# Patient Record
Sex: FEMALE | Race: BLACK OR AFRICAN AMERICAN | NOT HISPANIC OR LATINO | Employment: FULL TIME | URBAN - METROPOLITAN AREA
[De-identification: names, ages, dates, MRNs, and addresses within clinical notes are randomized per-mention and may not be internally consistent; named-entity substitution may affect disease eponyms.]

---

## 2017-05-26 DIAGNOSIS — Z36.89 ENCOUNTER TO ESTABLISH GESTATIONAL AGE USING ULTRASOUND: Primary | ICD-10-CM

## 2017-06-08 ENCOUNTER — INITIAL PRENATAL (OUTPATIENT)
Dept: OBSTETRICS AND GYNECOLOGY | Facility: CLINIC | Age: 22
End: 2017-06-08

## 2017-06-08 VITALS
WEIGHT: 111 LBS | HEIGHT: 63 IN | SYSTOLIC BLOOD PRESSURE: 100 MMHG | BODY MASS INDEX: 19.67 KG/M2 | DIASTOLIC BLOOD PRESSURE: 60 MMHG

## 2017-06-08 DIAGNOSIS — A60.09: ICD-10-CM

## 2017-06-08 DIAGNOSIS — Z36.9 ANTENATAL SCREENING ENCOUNTER: ICD-10-CM

## 2017-06-08 DIAGNOSIS — B37.31 YEAST VAGINITIS: ICD-10-CM

## 2017-06-08 DIAGNOSIS — Z01.419 WELL FEMALE EXAM WITH ROUTINE GYNECOLOGICAL EXAM: Primary | ICD-10-CM

## 2017-06-08 DIAGNOSIS — Z34.81 PRENATAL CARE, SUBSEQUENT PREGNANCY, FIRST TRIMESTER: ICD-10-CM

## 2017-06-08 DIAGNOSIS — F41.8 DEPRESSION WITH ANXIETY: ICD-10-CM

## 2017-06-08 DIAGNOSIS — O98.311: ICD-10-CM

## 2017-06-08 LAB
BACTERIA UR QL AUTO: ABNORMAL /HPF
BILIRUB UR QL STRIP: NEGATIVE
CLARITY UR: ABNORMAL
COLOR UR: YELLOW
GLUCOSE UR STRIP-MCNC: NEGATIVE MG/DL
HGB UR QL STRIP.AUTO: NEGATIVE
HYALINE CASTS UR QL AUTO: ABNORMAL /LPF
KETONES UR QL STRIP: NEGATIVE
LEUKOCYTE ESTERASE UR QL STRIP.AUTO: ABNORMAL
NITRITE UR QL STRIP: NEGATIVE
PH UR STRIP.AUTO: 7.5 [PH] (ref 5–8)
PROT UR QL STRIP: ABNORMAL
RBC # UR: ABNORMAL /HPF
REF LAB TEST METHOD: ABNORMAL
SP GR UR STRIP: 1.02 (ref 1–1.03)
SQUAMOUS #/AREA URNS HPF: ABNORMAL /HPF
UROBILINOGEN UR QL STRIP: ABNORMAL
WBC UR QL AUTO: ABNORMAL /HPF
WET PREP GENITAL: NORMAL

## 2017-06-08 PROCEDURE — 99204 OFFICE O/P NEW MOD 45 MIN: CPT | Performed by: OBSTETRICS & GYNECOLOGY

## 2017-06-08 PROCEDURE — 81001 URINALYSIS AUTO W/SCOPE: CPT | Performed by: OBSTETRICS & GYNECOLOGY

## 2017-06-08 PROCEDURE — 87086 URINE CULTURE/COLONY COUNT: CPT | Performed by: OBSTETRICS & GYNECOLOGY

## 2017-06-08 PROCEDURE — 87210 SMEAR WET MOUNT SALINE/INK: CPT | Performed by: OBSTETRICS & GYNECOLOGY

## 2017-06-08 RX ORDER — DIPHENHYDRAMINE HYDROCHLORIDE 25 MG/1
CAPSULE ORAL
Refills: 0 | COMMUNITY
Start: 2017-05-04 | End: 2018-01-29

## 2017-06-08 RX ORDER — VALACYCLOVIR HYDROCHLORIDE 500 MG/1
TABLET, FILM COATED ORAL
COMMUNITY
Start: 2017-06-07 | End: 2017-08-24 | Stop reason: SDUPTHER

## 2017-06-08 RX ORDER — PRENATAL VIT NO.126/IRON/FOLIC 28MG-0.8MG
TABLET ORAL
Refills: 1 | COMMUNITY
Start: 2017-04-19 | End: 2017-08-24 | Stop reason: CLARIF

## 2017-06-08 RX ORDER — CALCIUM CARBONATE 750 MG/1
TABLET, CHEWABLE ORAL
Refills: 0 | COMMUNITY
Start: 2017-05-04 | End: 2017-08-24

## 2017-06-10 LAB — BACTERIA SPEC AEROBE CULT: NORMAL

## 2017-06-22 ENCOUNTER — ROUTINE PRENATAL (OUTPATIENT)
Dept: OBSTETRICS AND GYNECOLOGY | Facility: CLINIC | Age: 22
End: 2017-06-22

## 2017-06-22 ENCOUNTER — APPOINTMENT (OUTPATIENT)
Dept: LAB | Facility: HOSPITAL | Age: 22
End: 2017-06-22

## 2017-06-22 VITALS — WEIGHT: 109 LBS | BODY MASS INDEX: 19.31 KG/M2

## 2017-06-22 DIAGNOSIS — F41.8 DEPRESSION WITH ANXIETY: ICD-10-CM

## 2017-06-22 DIAGNOSIS — K59.00 CONSTIPATION, UNSPECIFIED CONSTIPATION TYPE: ICD-10-CM

## 2017-06-22 DIAGNOSIS — Z34.81 PRENATAL CARE, SUBSEQUENT PREGNANCY, FIRST TRIMESTER: Primary | ICD-10-CM

## 2017-06-22 DIAGNOSIS — O21.9 NAUSEA AND VOMITING DURING PREGNANCY PRIOR TO 22 WEEKS GESTATION: ICD-10-CM

## 2017-06-22 LAB
ABO GROUP BLD: NORMAL
BASOPHILS # BLD AUTO: 0.01 10*3/MM3 (ref 0–0.2)
BASOPHILS NFR BLD AUTO: 0.1 % (ref 0–1)
BLD GP AB SCN SERPL QL: NEGATIVE
DEPRECATED RDW RBC AUTO: 39.4 FL (ref 37–54)
EOSINOPHIL # BLD AUTO: 0.09 10*3/MM3 (ref 0.1–0.3)
EOSINOPHIL NFR BLD AUTO: 1.1 % (ref 0–3)
ERYTHROCYTE [DISTWIDTH] IN BLOOD BY AUTOMATED COUNT: 14.2 % (ref 11.3–14.5)
HBA1C MFR BLD: 5.2 % (ref 4.8–5.6)
HBV SURFACE AG SERPL QL IA: NORMAL
HCT VFR BLD AUTO: 36.7 % (ref 34.5–44)
HCV AB SER DONR QL: NORMAL
HGB BLD-MCNC: 11.7 G/DL (ref 11.5–15.5)
HIV1+2 AB SER QL: NORMAL
IMM GRANULOCYTES # BLD: 0.02 10*3/MM3 (ref 0–0.03)
IMM GRANULOCYTES NFR BLD: 0.3 % (ref 0–0.6)
LYMPHOCYTES # BLD AUTO: 1.66 10*3/MM3 (ref 0.6–4.8)
LYMPHOCYTES NFR BLD AUTO: 21 % (ref 24–44)
MCH RBC QN AUTO: 24.1 PG (ref 27–31)
MCHC RBC AUTO-ENTMCNC: 31.9 G/DL (ref 32–36)
MCV RBC AUTO: 75.5 FL (ref 80–99)
MONOCYTES # BLD AUTO: 0.4 10*3/MM3 (ref 0–1)
MONOCYTES NFR BLD AUTO: 5.1 % (ref 0–12)
NEUTROPHILS # BLD AUTO: 5.73 10*3/MM3 (ref 1.5–8.3)
NEUTROPHILS NFR BLD AUTO: 72.4 % (ref 41–71)
PLATELET # BLD AUTO: 272 10*3/MM3 (ref 150–450)
PMV BLD AUTO: 12.2 FL (ref 6–12)
RBC # BLD AUTO: 4.86 10*6/MM3 (ref 3.89–5.14)
RH BLD: POSITIVE
RUBV IGG SER QL: NORMAL
RUBV IGG SER-ACNC: 28.7 IU/ML
TSH SERPL DL<=0.05 MIU/L-ACNC: 0.09 MIU/ML (ref 0.35–5.35)
WBC NRBC COR # BLD: 7.91 10*3/MM3 (ref 3.5–10.8)

## 2017-06-22 PROCEDURE — 86803 HEPATITIS C AB TEST: CPT | Performed by: OBSTETRICS & GYNECOLOGY

## 2017-06-22 PROCEDURE — 80081 OBSTETRIC PANEL INC HIV TSTG: CPT | Performed by: OBSTETRICS & GYNECOLOGY

## 2017-06-22 PROCEDURE — 83036 HEMOGLOBIN GLYCOSYLATED A1C: CPT | Performed by: OBSTETRICS & GYNECOLOGY

## 2017-06-22 PROCEDURE — 36415 COLL VENOUS BLD VENIPUNCTURE: CPT | Performed by: OBSTETRICS & GYNECOLOGY

## 2017-06-22 PROCEDURE — 84443 ASSAY THYROID STIM HORMONE: CPT | Performed by: OBSTETRICS & GYNECOLOGY

## 2017-06-22 PROCEDURE — 99213 OFFICE O/P EST LOW 20 MIN: CPT | Performed by: OBSTETRICS & GYNECOLOGY

## 2017-06-22 RX ORDER — DOCUSATE SODIUM 100 MG/1
100 CAPSULE, LIQUID FILLED ORAL 2 TIMES DAILY
Qty: 60 CAPSULE | Refills: 12 | Status: SHIPPED | OUTPATIENT
Start: 2017-06-22 | End: 2018-01-29

## 2017-06-22 RX ORDER — ONDANSETRON 4 MG/1
4 TABLET, ORALLY DISINTEGRATING ORAL EVERY 8 HOURS PRN
Qty: 30 TABLET | Refills: 3 | Status: SHIPPED | OUTPATIENT
Start: 2017-06-22 | End: 2018-01-29

## 2017-06-22 RX ORDER — ONDANSETRON 4 MG/1
TABLET, ORALLY DISINTEGRATING ORAL
Refills: 0 | COMMUNITY
Start: 2017-06-12 | End: 2017-06-22 | Stop reason: SDUPTHER

## 2017-06-22 NOTE — PROGRESS NOTES
No results found for: ABORH, LABANTI, ABID    Chief Complaint   Patient presents with   • Routine Prenatal Visit     C/O constipation, and cramping.       Today Chasity complaints of constipation   See ob flowsheet for physical exam.    Prenatal Assessment  Fetal Heart Rate: 164  Movement: Absent  Prenatal Vitals  Weight: 109 lb (49.4 kg)  Urine Glucose/Protein  Urine Glucose: Negative  Urine Protein: Trace  Edema  LLE Edema: None  RLE Edema: None  Facial Edema: None     Tests done today: none  At the time of the next visit, she will need to have none    Impression:   Encounter Diagnoses   Name Primary?   • Prenatal care, subsequent pregnancy, first trimester Yes   • Depression with anxiety    • Constipation, unspecified constipation type    • Nausea and vomiting during pregnancy prior to 22 weeks gestation        Plan: Return 4 weeks    This note was electronically signed.    Hakeem Retana MD

## 2017-06-23 LAB — RPR SER QL: NORMAL

## 2017-06-24 LAB — VZV IGG SER IA-ACNC: >4000 INDEX

## 2017-07-20 ENCOUNTER — ROUTINE PRENATAL (OUTPATIENT)
Dept: OBSTETRICS AND GYNECOLOGY | Facility: CLINIC | Age: 22
End: 2017-07-20

## 2017-07-20 VITALS — SYSTOLIC BLOOD PRESSURE: 110 MMHG | DIASTOLIC BLOOD PRESSURE: 70 MMHG | WEIGHT: 113 LBS | BODY MASS INDEX: 20.02 KG/M2

## 2017-07-20 DIAGNOSIS — Z36.89 ENCOUNTER FOR FETAL ANATOMIC SURVEY: ICD-10-CM

## 2017-07-20 DIAGNOSIS — N89.8 VAGINAL DISCHARGE DURING PREGNANCY IN SECOND TRIMESTER: ICD-10-CM

## 2017-07-20 DIAGNOSIS — R30.0 DYSURIA: Primary | ICD-10-CM

## 2017-07-20 DIAGNOSIS — O26.892 VAGINAL DISCHARGE DURING PREGNANCY IN SECOND TRIMESTER: ICD-10-CM

## 2017-07-20 DIAGNOSIS — Z34.82 PRENATAL CARE, SUBSEQUENT PREGNANCY, SECOND TRIMESTER: ICD-10-CM

## 2017-07-20 LAB
BACTERIA UR QL AUTO: ABNORMAL /HPF
BILIRUB UR QL STRIP: NEGATIVE
CLARITY UR: ABNORMAL
COLOR UR: YELLOW
GLUCOSE UR STRIP-MCNC: NEGATIVE MG/DL
HGB UR QL STRIP.AUTO: NEGATIVE
HYALINE CASTS UR QL AUTO: ABNORMAL /LPF
KETONES UR QL STRIP: NEGATIVE
LEUKOCYTE ESTERASE UR QL STRIP.AUTO: ABNORMAL
NITRITE UR QL STRIP: NEGATIVE
PH UR STRIP.AUTO: 8 [PH] (ref 5–8)
PROT UR QL STRIP: NEGATIVE
RBC # UR: ABNORMAL /HPF
REF LAB TEST METHOD: ABNORMAL
SP GR UR STRIP: 1.02 (ref 1–1.03)
SQUAMOUS #/AREA URNS HPF: ABNORMAL /HPF
UROBILINOGEN UR QL STRIP: ABNORMAL
WBC UR QL AUTO: ABNORMAL /HPF
WET PREP GENITAL: NORMAL

## 2017-07-20 PROCEDURE — 81001 URINALYSIS AUTO W/SCOPE: CPT | Performed by: OBSTETRICS & GYNECOLOGY

## 2017-07-20 PROCEDURE — 87210 SMEAR WET MOUNT SALINE/INK: CPT | Performed by: OBSTETRICS & GYNECOLOGY

## 2017-07-20 PROCEDURE — 99213 OFFICE O/P EST LOW 20 MIN: CPT | Performed by: OBSTETRICS & GYNECOLOGY

## 2017-07-20 PROCEDURE — 87086 URINE CULTURE/COLONY COUNT: CPT | Performed by: OBSTETRICS & GYNECOLOGY

## 2017-07-20 RX ORDER — TERCONAZOLE 80 MG/1
80 SUPPOSITORY VAGINAL NIGHTLY
Qty: 3 SUPPOSITORY | Refills: 3 | Status: SHIPPED | OUTPATIENT
Start: 2017-07-20 | End: 2017-08-24

## 2017-07-20 NOTE — PROGRESS NOTES
No results found for: ABORH, LABANTI, ABID    Chief Complaint   Patient presents with   • Routine Prenatal Visit   • Difficulty Urinating   • Vaginal Discharge     c/o whitest D/C and itching,        Today Chasity complains of vaginal discharge  See ob flowsheet for physical exam.    Prenatal Assessment  Fetal Heart Rate: 152  Movement: Present  Prenatal Vitals  BP: 110/70  Weight: 113 lb (51.3 kg)  Urine Glucose/Protein  Urine Glucose: Negative  Urine Protein: Trace  Vaginal Drainage  Draining Fluid: Yes  Edema  LLE Edema: None  RLE Edema: None  Facial Edema: None     Tests done today: wet mount shows yeast  At the time of the next visit, she will need to have U/S for anatomic screening - here    Impression:   Encounter Diagnoses   Name Primary?   • Dysuria Yes   • Prenatal care, subsequent pregnancy, second trimester    • Vaginal discharge during pregnancy in second trimester    • Encounter for fetal anatomic survey        Plan: return 3 weeks,Wet mount shows yeast, will treat with Terazol sup, anatomical US on return    This note was electronically signed.    Hakeem Retana MD

## 2017-07-22 ENCOUNTER — HOSPITAL ENCOUNTER (OUTPATIENT)
Facility: HOSPITAL | Age: 22
Setting detail: OBSERVATION
Discharge: HOME | End: 2017-07-22
Attending: OBSTETRICS & GYNECOLOGY | Admitting: OBSTETRICS & GYNECOLOGY

## 2017-07-22 VITALS
HEART RATE: 87 BPM | SYSTOLIC BLOOD PRESSURE: 103 MMHG | RESPIRATION RATE: 17 BRPM | TEMPERATURE: 98.3 F | DIASTOLIC BLOOD PRESSURE: 56 MMHG

## 2017-07-22 PROBLEM — Z34.90 PREGNANCY: Status: ACTIVE | Noted: 2017-07-22

## 2017-07-22 LAB — BACTERIA SPEC AEROBE CULT: NORMAL

## 2017-07-22 PROCEDURE — 99213 OFFICE O/P EST LOW 20 MIN: CPT | Performed by: OBSTETRICS & GYNECOLOGY

## 2017-07-22 PROCEDURE — G0378 HOSPITAL OBSERVATION PER HR: HCPCS

## 2017-07-22 RX ORDER — ACETAMINOPHEN 325 MG/1
650 TABLET ORAL EVERY 6 HOURS PRN
COMMUNITY
End: 2017-12-21 | Stop reason: HOSPADM

## 2017-07-22 NOTE — NURSING NOTE
D/C instructions reviewed with patient.  V/U.  No further questions at this time.  V/S stable.  Alert, oriented, and smiling.  W/C ride requested.  D/C'd with belongings.

## 2017-07-22 NOTE — PROGRESS NOTES
"[facility]  Obstetric History and Physical    Chief Complaint   Patient presents with   • Abdominal Cramping     \"It starts down low and shoots to my back, it started early yesterday\" \"I don't know if I've been workin too much or what\"   • Headache     \"Tylenol won't get rid of it, it started the same time as my cramping\"   • Sore Throat     \"It hurts when I swallow\"   • Decreased Fetal Movement     \"Sometimes I feel flutters when I get out of the bathtub, but I haven't been feeling them\"       Subjective     Patient is a 22 y.o. female  currently at 17w2d, who presents with C/O , mild pressure and cramping for 2 days without associated leaking of fluid or vaginal bleeding.  Patient denies any other associated symptoms.  Patient missed having intermittent headaches.  Patient's prenatal course with Dr. Sharif without complications..    Her prenatal care is complicated by  nne.  Her previous obstetric/gynecological history is noted for is remarkable for .    The following portions of the patients history were reviewed and updated as appropriate: current medications, allergies, past medical history, past surgical history, past family history, past social history and problem list .       Prenatal Information:   Maternal Prenatal Labs  Blood Type No results found for: ABO   Rh Status No results found for: RH   Antibody Screen No results found for: ABSCRN   Gonnorhea No results found for: GCCX   Chlamydia No results found for: CLAMYDCU   RPR No results found for: RPR   Syphilis Antibody No results found for: SYPHILIS   Rubella No results found for: RUBELLAIGGIN   Hepatitis B Surface Antigen No results found for: HEPBSAG   HIV-1 Antibody No results found for: LABHIV1   Hepatitis C Antibody No results found for: HEPCAB   Rapid Urin Drug Screen No results found for: AMPMETHU, BARBITSCNUR, LABBENZSCN, LABMETHSCN, LABOPIASCN, THCURSCR, COCAINEUR, AMPHETSCREEN, PROPOXSCN, BUPRENORSCNU, METAMPSCNUR, OXYCODONESCN, " TRICYCLICSCN   Group B Strep Culture No results found for: GBSANTIGEN                 Past OB History:       Obstetric History       T0      TAB0   SAB2   E0   M0   L0       # Outcome Date GA Lbr Varinder/2nd Weight Sex Delivery Anes PTL Lv   3 Current            2 2017 5w0d    SAB      1 2013     SAB             Past Medical History: Past Medical History:   Diagnosis Date   • Anxiety    • Depression    • Genital herpes    • Scoliosis     Diagnosed at age 12      Past Surgical History Past Surgical History:   Procedure Laterality Date   • TONSILLECTOMY      age 12      Family History: Family History   Problem Relation Age of Onset   • Bipolar disorder Mother    • No Known Problems Father    • Diabetes Maternal Grandfather    • Diabetes Maternal Aunt    • Breast cancer Neg Hx    • Ovarian cancer Neg Hx    • Colon cancer Neg Hx       Social History:  reports that she has never smoked. She has never used smokeless tobacco.   reports that she does not drink alcohol.   reports that she does not use illicit drugs.                   General ROS Negative Findings:Visual Changes, Epigastric pain, Anorexia, Nausia/Vomiting, ROM and Vaginal Bleeding    ROS      Objective       Vital Signs Range for the last 24 hours  Temperature: Temp:  [98.3 °F (36.8 °C)] 98.3 °F (36.8 °C)   Temp Source: Temp src: Axillary   BP: BP: (103)/(56) 103/56   Pulse: Heart Rate:  [87] 87   Respirations: Resp:  [17] 17   SPO2:     O2 Amount (l/min):     O2 Devices     Weight:       Physical Examination:   General:   alert, appears stated age and cooperative   Skin:   normal   HEENT:     Lungs:   clear to auscultation bilaterally   Heart:   regular rate and rhythm, S1, S2 normal, no murmur, click, rub or gallop   Abdomen: Soft, gravid uterus-nontender, no rebound, no guarding, negative CVA tenderness.     Lower Extremeties Trace of edema, no calf tenderness, DTRs 2+ over 4 no clonus    Pelvis:  External genitalia: normal general  appearance  Vaginal: normal without tenderness, induration or masses  Uterus: enlarged         Presentation:    Cervix: Exam by: Method: sterile exam per physician   Dilation: Dilation: 0   Effacement:     Station:         Fetal Heart Rate Assessment   Method: Fetal HR Assessment Method: intermittent auscultation, using Doppler   Beats/min: Fetal HR (Beats/Min): 151   Baseline:     Varibility:     Accels:     Decels:     Tracing Category:       Uterine Assessment   Method:     Frequency (min):     Ctx Count in 10 min:     Duration:     Intensity:     Intensity by IUPC:     Resting Tone:     Resting Tone by IUPC:     Gibson Units:       Laboratory Results: @hhairgeq55@  Radiology Review:@lastrad@  Other Studies:    Assessment/Plan     Active Problems:    Pregnancy        Assessment:  1.  Intrauterine pregnancy at 17w2d weeks gestation with reassuring fetal status.    2.  Discomforts of pregnancy without sign of labor or rupture membranes  3.   4.      Plan:  1. discharge to home, note for work today and tomorrow follow-up with Dr. Sharif routine or when necessary basis.  2. Plan of care has been reviewed with patient.  3.  Risks, benefits of treatment plan have been discussed.  4.  All questions have been answered.  5      Pedro Pablo Martell DO  7/22/2017  3:50 PM

## 2017-08-04 ENCOUNTER — HOSPITAL ENCOUNTER (EMERGENCY)
Facility: HOSPITAL | Age: 22
Discharge: HOME | End: 2017-08-05
Attending: EMERGENCY MEDICINE | Admitting: EMERGENCY MEDICINE

## 2017-08-04 ENCOUNTER — HOSPITAL ENCOUNTER (OUTPATIENT)
Facility: HOSPITAL | Age: 22
Discharge: STILL ADMITTED OR ADMINISTRATIVE | End: 2017-08-04
Attending: OBSTETRICS & GYNECOLOGY | Admitting: OBSTETRICS & GYNECOLOGY

## 2017-08-04 VITALS — DIASTOLIC BLOOD PRESSURE: 54 MMHG | SYSTOLIC BLOOD PRESSURE: 116 MMHG | HEART RATE: 83 BPM | RESPIRATION RATE: 16 BRPM

## 2017-08-04 DIAGNOSIS — M54.6 ACUTE MIDLINE THORACIC BACK PAIN: Primary | ICD-10-CM

## 2017-08-04 DIAGNOSIS — Z33.1 IUP (INTRAUTERINE PREGNANCY), INCIDENTAL: ICD-10-CM

## 2017-08-04 LAB
BACTERIA UR QL AUTO: ABNORMAL /HPF
BILIRUB UR QL STRIP: NEGATIVE
CLARITY UR: ABNORMAL
COLOR UR: YELLOW
GLUCOSE UR STRIP-MCNC: NEGATIVE MG/DL
HGB UR QL STRIP.AUTO: NEGATIVE
HYALINE CASTS UR QL AUTO: ABNORMAL /LPF
KETONES UR QL STRIP: NEGATIVE
LEUKOCYTE ESTERASE UR QL STRIP.AUTO: ABNORMAL
NITRITE UR QL STRIP: NEGATIVE
PH UR STRIP.AUTO: 6 [PH] (ref 5–8)
PROT UR QL STRIP: NEGATIVE
RBC # UR: ABNORMAL /HPF
REF LAB TEST METHOD: ABNORMAL
SP GR UR STRIP: 1.02 (ref 1–1.03)
SQUAMOUS #/AREA URNS HPF: ABNORMAL /HPF
UROBILINOGEN UR QL STRIP: ABNORMAL
WBC UR QL AUTO: ABNORMAL /HPF

## 2017-08-04 PROCEDURE — 99283 EMERGENCY DEPT VISIT LOW MDM: CPT

## 2017-08-04 PROCEDURE — 81001 URINALYSIS AUTO W/SCOPE: CPT | Performed by: OBSTETRICS & GYNECOLOGY

## 2017-08-04 PROCEDURE — 87086 URINE CULTURE/COLONY COUNT: CPT | Performed by: OBSTETRICS & GYNECOLOGY

## 2017-08-04 PROCEDURE — 81003 URINALYSIS AUTO W/O SCOPE: CPT | Performed by: OBSTETRICS & GYNECOLOGY

## 2017-08-04 RX ORDER — HYDROCODONE BITARTRATE AND ACETAMINOPHEN 5; 325 MG/1; MG/1
1 TABLET ORAL 4 TIMES DAILY PRN
Qty: 12 TABLET | Refills: 0 | Status: SHIPPED | OUTPATIENT
Start: 2017-08-04 | End: 2018-01-29

## 2017-08-05 VITALS
BODY MASS INDEX: 20.04 KG/M2 | DIASTOLIC BLOOD PRESSURE: 46 MMHG | HEIGHT: 63 IN | TEMPERATURE: 98.6 F | WEIGHT: 113.1 LBS | HEART RATE: 95 BPM | OXYGEN SATURATION: 100 % | SYSTOLIC BLOOD PRESSURE: 93 MMHG | RESPIRATION RATE: 16 BRPM

## 2017-08-05 NOTE — ED PROVIDER NOTES
"Subjective   Patient is a 22 y.o. female presenting with back pain.   History provided by:  Patient  Back Pain   Location:  Thoracic spine  Quality:  Aching  Radiates to:  Does not radiate  Pain severity:  Severe  Pain is:  Worse during the day  Onset quality:  Sudden  Duration:  2 days  Timing:  Constant  Progression:  Waxing and waning (gets worse when on feet for a long time)  Chronicity:  New  Context comment:  Spontaneous onset  Relieved by:  Bed rest and being still (lying on side)  Worsened by:  Standing  Associated symptoms: no abdominal pain, no bladder incontinence, no dysuria, no fever and no numbness    Risk factors comment:  Denies IV drug abuse      Review of Systems   Constitutional: Negative.  Negative for fever.        Denies any infectious disease or fevers in the last month   HENT: Negative.    Respiratory: Negative.    Cardiovascular: Negative.    Gastrointestinal: Negative.  Negative for abdominal pain.   Genitourinary: Negative for bladder incontinence and dysuria.        19 weeks pregnant, normal prenatal course to date   Musculoskeletal: Positive for back pain.   Neurological: Negative.  Negative for numbness.   Psychiatric/Behavioral: Negative.    All other systems reviewed and are negative.      Past Medical History:   Diagnosis Date   • Anxiety    • Depression    • Genital herpes    • Scoliosis     Diagnosed at age 12       Allergies   Allergen Reactions   • Fruit & Vegetable Daily [Nutritional Supplements] Swelling   • Nuts      \"I'm allergic to all tree nuts and all fruits, my throat swells and tongue itches\"   • Penicillins      \"I don't remember what happens\"   • Latex Rash       Past Surgical History:   Procedure Laterality Date   • TONSILLECTOMY      age 12   • WISDOM TOOTH EXTRACTION         Family History   Problem Relation Age of Onset   • Bipolar disorder Mother    • No Known Problems Father    • Diabetes Maternal Grandfather    • Diabetes Maternal Aunt    • Breast cancer Neg Hx  "   • Ovarian cancer Neg Hx    • Colon cancer Neg Hx        Social History     Social History   • Marital status: Single     Spouse name: N/A   • Number of children: N/A   • Years of education: N/A     Social History Main Topics   • Smoking status: Never Smoker   • Smokeless tobacco: Never Used   • Alcohol use No   • Drug use: No   • Sexual activity: Yes     Partners: Male     Birth control/ protection: None     Other Topics Concern   • None     Social History Narrative           Objective   Physical Exam   Constitutional: She is oriented to person, place, and time. She appears well-developed and well-nourished. No distress.   Concerned, hurting female, worried that something bad must be wrong.   HENT:   Head: Normocephalic and atraumatic.   Airway patent.   Eyes: Conjunctivae are normal. No scleral icterus.   Neck: Phonation normal. Neck supple.   Cardiovascular: Normal rate and regular rhythm.    Pulmonary/Chest: Effort normal and breath sounds normal. No respiratory distress.   Abdominal: Soft. There is no tenderness.   Musculoskeletal:   She has a fairly small area of tenderness over the lower thoracic spine approximately T 10-11 with a little right paraspinous muscle tenderness, too.   Neurological: She is alert and oriented to person, place, and time.   Skin: Skin is warm and dry.   Psychiatric: She has a normal mood and affect. Her behavior is normal.   Nursing note and vitals reviewed.      Procedures         ED Course  ED Course   Comment By Time   UA not striking for infection, more likely represents minor contamination.  Her pain is over the lower thoracic spine and not the CVAs.  Will not antibiosis. Rubens Raya MD 08/04 6745   JOAN query unsuccessful secondary to prolonged retrieval time/inoperable JOAN system. Rubens Raya MD 08/04 [id number]                  [facility]    Final diagnoses:   Acute midline thoracic back pain   IUP (intrauterine pregnancy), incidental            Rubens Raya MD  08/05/17  [id number]

## 2017-08-05 NOTE — DISCHARGE INSTRUCTIONS
Activity as tolerated.  Do not use tylenol at the same time as you are using the hydrocodone/acetaminophen combination.  Return to the ER if having fever, trouble urinating, or numbness around your bottom and vagina, or difficulty using your legs.  None of these things are likely or expected.    CONTROLLED SUBSTANCE(S) EDUCATION  Controlled Substances have been prescribed by your provider to treat your medical condition and associated symptoms. Although Controlled Substances can be effective in relieving your pain or other symptoms, they may also cause serious adverse effects. It is important that you understand how to safely and appropriately take these medications.  Proper Use  1. Carefully following instructions for use, including timing of doses, whether to take the  medication with or without food, and any foods or other medications to avoid while taking the medication;  2. If you have low or impaired vision you should wear glasses when taking the medication and not take the medication in the dark;  3. You should read the prescription container label each time to confirm the dosage;  4. You should never use the medication after the expiration date;  5. You must never share the medication with others;  6. You must not take the medication with alcohol or other sedatives;  7. You should not take the medication to help you sleep;  8. You should never break, crush or chew the medication;  9. If you have been prescribed a skin patch (transdermal), external heat, fever and exertion can increase the absorption of these products, leading to potentially fatal overdose;  10. You should immediately contact the physician?s office to report any adverse reaction and,  11. It is illegal to share, sell or give away Controlled Substances.  Driving and Work Safety  1. Controlled Substances may cause sleepiness, clouded thinking, decreased concentration, slower reflexes, or incoordination, all of which may create a danger to you  and others when driving or operating certain type of machinery;  2. Avoid, if possible, driving or engaging in other potentially dangerous work or other activities, for a specific period of time until the initial effects of the Controlled Substances no longer create such dangers; and,  3. Ingesting other substances, such as alcohol, benzodiazepines or some cold remedies, at the same time you are taking the Controlled Substances prescribed or dispensed may increase cognitive and motor impairment.  Pregnancy  If you are pregnant or nursing a baby, avoid using Controlled Substances, or use them on a minimal basis in strict accordance with your provider?s instructions.  Potential for Overdose and Response  1. The use of Controlled Substances creates a risk of respiratory depression, which may result in serious harm or death. You and others should be watchful for the following warning signs of overmedication:  ? intoxicated behavior, such as confusion, slurred speech, or stumbling;  ? feeling dizzy or faint;  ? acting very drowsy or groggy;  ? unusual snoring, gasping, or snorting during sleep;  ? and/or difficulty waking up from sleep or difficulty in staying awake.  2. Immediately call ?911? or an emergency service upon you or your caregivers observing or experiencing any of the following conditions:  ? you cannot be aroused or waken, or are unable to talk after being awakened;  ? you have shortness of breath, slow or light breathing, or stopped breathing;  ? gurgling noises coming from your mouth or throat;  ? your body is limp, seems lifeless;  ? your face is pale or clammy;  ? your fingernails or lips are turning purple or blue; and/or  ? your heartbeat is slow, unusual or stopped  Safe Storage of Controlled Substances  1. If your Controlled Substances are not stored in a safe manner there is a potential that  partners, family members or others may improperly obtain your Controlled Substances;  2. Always keep the  Controlled Substances in the original container;  3. Store Controlled Substances in a locked cabinet or other secure storage unit, that is cool, dry and out of direct sunlight, such as:  ? an existing safe;  ? a cut-proof travel bag;  ? a portable lock box designed for travel; or,  ? a locking medical box.  4. Do not store Controlled Substances in:  ? an unlocked medicine cabinet;  ? in your car; or,  ? in a refrigerator or freezer unless specifically recommended by the prescriber or  pharmacist; and  5. Immediately notify your provider if any Controlled Substances prescribed or dispensed by the provider are stolen or improperly taken by another individual.  Proper Disposal  1. It is important to safely and appropriately dispose of unused Controlled Substances that had been prescribed or dispensed by your provider;  2. Promptly dispose of unused Controlled Substances after the expiration date of the  prescription or after you no longer require the Controlled Substances to treat your medical condition;  3. In order to safely dispose of Controlled Substances, you should turn in the unused Controlled Substances as part of an approved governmental drug take-back program. The [place] Office of Drug Control Policy has a listing of [place] Permanent Drug Disposal Locations at http://www.odcp.[place].gov - click on the [place] Prescriptions Drug Drop Map and Location on the left side of the page.  4. You should not flush Controlled Substances down the toilet; and,  5. You should personally remove any identifying information, including the prescription number, from an empty Controlled Substance container and then properly dispose of the empty container.  CONSENT FOR TREATMENT WITH CONTROLLED SUBSTANCE(S)  (This is for an initial prescription)  1. Controlled Substances  Controlled Substances are prescribed to treat a variety of conditions, including the relief  of chronic pain, to provide stimulation, promote weight loss, and  treat mood disorders.  Pain relief is an important medical reason to take Controlled Substances.  Controlled Substances are drugs or chemical substances whose possession and use are  regulated under the Controlled Substances Act. The law requires that patient are informed of the risks, benefits, and alternatives of taking Controlled Substances.  2. Adverse Effects  As with any medication, there are risks and adverse effects associated with the use of  Controlled Substances. Common adverse effects of pain medicines could include, but are not limited to: sedation or sleepiness, nausea, vomiting, constipation, pruritus (itching), confusion, respiratory depression, and urinary retention. Some of these effects may make it unsafe for you to drive a vehicle, operate heavy machinery, or perform other tasks that require concentration and coordination. Excessive use of these Controlled Substances can lead to profound sedation, respiratory depression, coma, and/or death. Regarding stimulants, adverse effects could include, but are not limited to: drug dependency, neuropsychiatric symptoms such as psychosis and nicole, weight loss, cardiovascular events such as heart attack and stroke, insomnia, hypertension, and agitation. Any questions you have regarding the Controlled Substance(s) should be discussed with the prescribing provider.  3. Physical Dependence, Tolerance, and Addiction  Although uncommon when used for their clinical indications, both pain relievers and  stimulants can cause physical dependence, tolerance, and/or addiction when used for a  prolonged period. Maintenance therapy with these Controlled Substances can cause  physical dependence. This means that if these medications are abruptly stopped, or  decreased significantly over a short period of time, a patient may experience withdrawal  symptoms such as: nervousness, irritability, insomnia, sweating, abdominal cramping,  nausea, vomiting, and diarrhea. Tolerance  occurs when the effects of these Controlled  Substances are decreased over a period of prolonged use making it necessary to increase the dosage. Physical dependence and tolerance are different than addiction. Addiction is a complex disease characterized by compulsive craving or seeking and use of a substance despite its extreme negatives on a person. The risk of addiction may be increased in a patient with a history of alcoholism or other addiction.  4. Alternatives  Controlled Substances are routinely prescribed to treat moderate to severe pain or other  medical conditions. Other medicines are available to treat these conditions that are not  associated with tolerance or addiction, however, are associated with a lower level of pain  relief or stimulation. It may also be an alternative to not take any medicine to treat these  conditions, or to use alternative modalities, other than medicine to treat these conditions.  I voluntarily consent to the receipt of the above-named Controlled Substance(s) as prescribed by my provider. I have been informed of the benefits, risks, and alternatives to taking these medications. I acknowledge that I have read and understood all of the information above and I have had the opportunity to ask questions and have them answered to my satisfaction.

## 2017-08-07 LAB — BACTERIA SPEC AEROBE CULT: NORMAL

## 2017-08-10 ENCOUNTER — ROUTINE PRENATAL (OUTPATIENT)
Dept: OBSTETRICS AND GYNECOLOGY | Facility: CLINIC | Age: 22
End: 2017-08-10

## 2017-08-10 VITALS — DIASTOLIC BLOOD PRESSURE: 68 MMHG | WEIGHT: 118 LBS | SYSTOLIC BLOOD PRESSURE: 110 MMHG | BODY MASS INDEX: 20.9 KG/M2

## 2017-08-10 DIAGNOSIS — Z36.89 ENCOUNTER FOR FETAL ANATOMIC SURVEY: ICD-10-CM

## 2017-08-10 DIAGNOSIS — N89.8 VAGINAL DISCHARGE DURING PREGNANCY IN SECOND TRIMESTER: ICD-10-CM

## 2017-08-10 DIAGNOSIS — F41.8 DEPRESSION WITH ANXIETY: ICD-10-CM

## 2017-08-10 DIAGNOSIS — B37.31 YEAST VAGINITIS: ICD-10-CM

## 2017-08-10 DIAGNOSIS — O26.892 VAGINAL DISCHARGE DURING PREGNANCY IN SECOND TRIMESTER: ICD-10-CM

## 2017-08-10 DIAGNOSIS — Z34.82 PRENATAL CARE, SUBSEQUENT PREGNANCY, SECOND TRIMESTER: Primary | ICD-10-CM

## 2017-08-10 LAB — WET PREP GENITAL: NORMAL

## 2017-08-10 PROCEDURE — 99213 OFFICE O/P EST LOW 20 MIN: CPT | Performed by: OBSTETRICS & GYNECOLOGY

## 2017-08-10 PROCEDURE — 87210 SMEAR WET MOUNT SALINE/INK: CPT | Performed by: OBSTETRICS & GYNECOLOGY

## 2017-08-10 RX ORDER — FLUCONAZOLE 150 MG/1
150 TABLET ORAL ONCE
Qty: 1 TABLET | Refills: 3 | Status: SHIPPED | OUTPATIENT
Start: 2017-08-10 | End: 2017-08-10

## 2017-08-10 NOTE — PROGRESS NOTES
No results found for: ABORH, LABANTI, ABID    Chief Complaint   Patient presents with   • Routine Prenatal Visit     C/O back pain   • Vaginal Discharge     Statss d/c is greenish , denies any odor       Today Chasity complains of greenish vaginal discharge with itching  See ob flowsheet for physical exam.    Prenatal Assessment  Fetal Heart Rate: 148  Fundal Height (cm): 21 cm  Movement: Present  Prenatal Vitals  BP: 110/68  Weight: 118 lb (53.5 kg)  Urine Glucose/Protein  Urine Glucose: Negative  Urine Protein: Negative  Vaginal Drainage  Draining Fluid: Yes  Edema  LLE Edema: None  RLE Edema: None  Facial Edema: None     Tests done today: Wet mount shows yeast infection, 20 week anatomical ultrasound  At the time of the next visit, she will need to have U/S for anatomic screening - Repeat anatomical ultrasound at [facility]    Impression:   Encounter Diagnoses   Name Primary?   • Prenatal care, subsequent pregnancy, second trimester Yes   • Depression with anxiety    • Encounter for fetal anatomic survey    • Vaginal discharge during pregnancy in second trimester    • Yeast vaginitis        Plan: We'll treat yeast infection with Terazol cream 7 pack and Diflucan, will repeat anatomical ultrasound at the [facility] to check the heart, will return in 2 weeks    This note was electronically signed.    Hakeem Retana MD

## 2017-08-24 ENCOUNTER — OFFICE VISIT (OUTPATIENT)
Dept: OBSTETRICS AND GYNECOLOGY | Facility: HOSPITAL | Age: 22
End: 2017-08-24

## 2017-08-24 ENCOUNTER — ROUTINE PRENATAL (OUTPATIENT)
Dept: OBSTETRICS AND GYNECOLOGY | Facility: CLINIC | Age: 22
End: 2017-08-24

## 2017-08-24 ENCOUNTER — HOSPITAL ENCOUNTER (OUTPATIENT)
Dept: WOMENS IMAGING | Facility: HOSPITAL | Age: 22
Discharge: HOME | End: 2017-08-24
Attending: OBSTETRICS & GYNECOLOGY | Admitting: OBSTETRICS & GYNECOLOGY

## 2017-08-24 VITALS — BODY MASS INDEX: 20.55 KG/M2 | WEIGHT: 116 LBS | SYSTOLIC BLOOD PRESSURE: 107 MMHG | DIASTOLIC BLOOD PRESSURE: 57 MMHG

## 2017-08-24 VITALS — SYSTOLIC BLOOD PRESSURE: 110 MMHG | DIASTOLIC BLOOD PRESSURE: 60 MMHG | WEIGHT: 116 LBS | BODY MASS INDEX: 20.55 KG/M2

## 2017-08-24 DIAGNOSIS — Z36.89 ENCOUNTER FOR FETAL ANATOMIC SURVEY: ICD-10-CM

## 2017-08-24 DIAGNOSIS — Z36.9 ANTENATAL SCREENING ENCOUNTER: ICD-10-CM

## 2017-08-24 DIAGNOSIS — O26.20 RECURRENT PREGNANCY LOSS WITH CURRENT PREGNANCY: ICD-10-CM

## 2017-08-24 DIAGNOSIS — Z3A.22 22 WEEKS GESTATION OF PREGNANCY: Primary | ICD-10-CM

## 2017-08-24 DIAGNOSIS — Z34.82 PRENATAL CARE, SUBSEQUENT PREGNANCY, SECOND TRIMESTER: Primary | ICD-10-CM

## 2017-08-24 PROCEDURE — 99212 OFFICE O/P EST SF 10 MIN: CPT | Performed by: OBSTETRICS & GYNECOLOGY

## 2017-08-24 PROCEDURE — 76811 OB US DETAILED SNGL FETUS: CPT | Performed by: OBSTETRICS & GYNECOLOGY

## 2017-08-24 PROCEDURE — 76811 OB US DETAILED SNGL FETUS: CPT

## 2017-08-24 RX ORDER — VALACYCLOVIR HYDROCHLORIDE 500 MG/1
500 TABLET, FILM COATED ORAL 2 TIMES DAILY
Qty: 60 TABLET | Refills: 12 | Status: SHIPPED | OUTPATIENT
Start: 2017-08-24 | End: 2019-01-04 | Stop reason: SDUPTHER

## 2017-08-24 NOTE — PROGRESS NOTES
No results found for: ABORH, LABANTI, ABID    Chief Complaint   Patient presents with   • Routine Prenatal Visit     Pt. has no complaints today, she just came from the [facility].       Today Chasity has no specific complaints  See ob flowsheet for physical exam.    Prenatal Assessment  Fetal Heart Rate: 142  Movement: Present  Prenatal Vitals  BP: 110/60  Weight: 116 lb (52.6 kg)     Tests done today: U/S for anatomic screening - anatomy completely seen today  At the time of the next visit, she will need to have GCT    Impression: No diagnosis found.    Plan: Return 2 weeks    This note was electronically signed.    Hakeem Retana MD

## 2017-08-24 NOTE — PROGRESS NOTES
Documentation of the ultasound findings, images, and interpretations with be available in the patient's Viewpoint report located in the Chart Review Imaging tab in [employer]

## 2017-09-01 ENCOUNTER — HOSPITAL ENCOUNTER (OUTPATIENT)
Facility: HOSPITAL | Age: 22
Setting detail: OBSERVATION
Discharge: HOME | End: 2017-09-02
Attending: OBSTETRICS & GYNECOLOGY | Admitting: OBSTETRICS & GYNECOLOGY

## 2017-09-01 VITALS
WEIGHT: 116 LBS | TEMPERATURE: 98.7 F | HEART RATE: 75 BPM | HEIGHT: 63 IN | SYSTOLIC BLOOD PRESSURE: 104 MMHG | BODY MASS INDEX: 20.55 KG/M2 | DIASTOLIC BLOOD PRESSURE: 59 MMHG

## 2017-09-01 PROCEDURE — 81001 URINALYSIS AUTO W/SCOPE: CPT | Performed by: OBSTETRICS & GYNECOLOGY

## 2017-09-01 PROCEDURE — 87086 URINE CULTURE/COLONY COUNT: CPT | Performed by: OBSTETRICS & GYNECOLOGY

## 2017-09-02 PROBLEM — O26.892 PELVIC PAIN AFFECTING PREGNANCY IN SECOND TRIMESTER, ANTEPARTUM: Status: ACTIVE | Noted: 2017-09-02

## 2017-09-02 PROBLEM — R10.2 PELVIC PAIN AFFECTING PREGNANCY IN SECOND TRIMESTER, ANTEPARTUM: Status: ACTIVE | Noted: 2017-09-02

## 2017-09-02 LAB
BACTERIA UR QL AUTO: ABNORMAL /HPF
BACTERIA UR QL AUTO: ABNORMAL /HPF
BILIRUB UR QL STRIP: NEGATIVE
BILIRUB UR QL STRIP: NEGATIVE
CLARITY UR: CLEAR
CLARITY UR: CLEAR
COLOR UR: YELLOW
COLOR UR: YELLOW
DEPRECATED RDW RBC AUTO: 44.5 FL (ref 37–54)
ERYTHROCYTE [DISTWIDTH] IN BLOOD BY AUTOMATED COUNT: 15.3 % (ref 11.3–14.5)
GLUCOSE UR STRIP-MCNC: NEGATIVE MG/DL
GLUCOSE UR STRIP-MCNC: NEGATIVE MG/DL
HCT VFR BLD AUTO: 30.9 % (ref 34.5–44)
HGB BLD-MCNC: 9.8 G/DL (ref 11.5–15.5)
HGB UR QL STRIP.AUTO: ABNORMAL
HGB UR QL STRIP.AUTO: NEGATIVE
HYALINE CASTS UR QL AUTO: ABNORMAL /LPF
HYALINE CASTS UR QL AUTO: ABNORMAL /LPF
KETONES UR QL STRIP: NEGATIVE
KETONES UR QL STRIP: NEGATIVE
LEUKOCYTE ESTERASE UR QL STRIP.AUTO: ABNORMAL
LEUKOCYTE ESTERASE UR QL STRIP.AUTO: ABNORMAL
MCH RBC QN AUTO: 25 PG (ref 27–31)
MCHC RBC AUTO-ENTMCNC: 31.7 G/DL (ref 32–36)
MCV RBC AUTO: 78.8 FL (ref 80–99)
NITRITE UR QL STRIP: NEGATIVE
NITRITE UR QL STRIP: NEGATIVE
PH UR STRIP.AUTO: 6.5 [PH] (ref 5–8)
PH UR STRIP.AUTO: 6.5 [PH] (ref 5–8)
PLATELET # BLD AUTO: 213 10*3/MM3 (ref 150–450)
PMV BLD AUTO: 11.7 FL (ref 6–12)
PROT UR QL STRIP: NEGATIVE
PROT UR QL STRIP: NEGATIVE
RBC # BLD AUTO: 3.92 10*6/MM3 (ref 3.89–5.14)
RBC # UR: ABNORMAL /HPF
RBC # UR: ABNORMAL /HPF
REF LAB TEST METHOD: ABNORMAL
REF LAB TEST METHOD: ABNORMAL
SP GR UR STRIP: 1.02 (ref 1–1.03)
SP GR UR STRIP: 1.02 (ref 1–1.03)
SQUAMOUS #/AREA URNS HPF: ABNORMAL /HPF
SQUAMOUS #/AREA URNS HPF: ABNORMAL /HPF
UROBILINOGEN UR QL STRIP: ABNORMAL
UROBILINOGEN UR QL STRIP: ABNORMAL
WBC NRBC COR # BLD: 12.32 10*3/MM3 (ref 3.5–10.8)
WBC UR QL AUTO: ABNORMAL /HPF
WBC UR QL AUTO: ABNORMAL /HPF

## 2017-09-02 PROCEDURE — G0378 HOSPITAL OBSERVATION PER HR: HCPCS

## 2017-09-02 PROCEDURE — 96372 THER/PROPH/DIAG INJ SC/IM: CPT

## 2017-09-02 PROCEDURE — 99219 PR INITIAL OBSERVATION CARE/DAY 50 MINUTES: CPT | Performed by: OBSTETRICS & GYNECOLOGY

## 2017-09-02 PROCEDURE — 25010000002 TERBUTALINE PER 1 MG: Performed by: OBSTETRICS & GYNECOLOGY

## 2017-09-02 PROCEDURE — 81001 URINALYSIS AUTO W/SCOPE: CPT | Performed by: OBSTETRICS & GYNECOLOGY

## 2017-09-02 PROCEDURE — 85027 COMPLETE CBC AUTOMATED: CPT | Performed by: OBSTETRICS & GYNECOLOGY

## 2017-09-02 PROCEDURE — 87086 URINE CULTURE/COLONY COUNT: CPT | Performed by: OBSTETRICS & GYNECOLOGY

## 2017-09-02 RX ORDER — TERBUTALINE SULFATE 1 MG/ML
0.25 INJECTION, SOLUTION SUBCUTANEOUS ONCE
Status: COMPLETED | OUTPATIENT
Start: 2017-09-02 | End: 2017-09-02

## 2017-09-02 RX ORDER — ONDANSETRON 4 MG/1
4 TABLET, FILM COATED ORAL EVERY 6 HOURS PRN
Status: DISCONTINUED | OUTPATIENT
Start: 2017-09-02 | End: 2017-09-02 | Stop reason: HOSPADM

## 2017-09-02 RX ADMIN — TERBUTALINE SULFATE 0.25 MG: 1 INJECTION SUBCUTANEOUS at 00:32

## 2017-09-02 RX ADMIN — ONDANSETRON 4 MG: 4 TABLET, FILM COATED ORAL at 01:20

## 2017-09-02 NOTE — H&P
"Chasity Pedro Pablo  1995  [id number]  [id number]    CC: lower abdominal and vaginal pain  HPI:  Patient is 22 y.o. black female   currently at 23w2d  Presents with c/o pelvic (lower abdominal) pain and vaginal pain,  Onset Wed, much worse today.  Initially intermittent, now constant, rates 7/10, radiates to vagina and rectum.  Movement worsens pain.  Denies assoc vag bleeding or SROM.  Good FM.  BPNC to date.    PMH:   Current meds PNV, valtrex, zoloft, vag cream (yeast infect)  Illnesses depression  Surgeries T and A, oral surg  Allergies PCN-hives   Latex-+rast    Past OB History:       Obstetric History       T0      TAB0   SAB2   E0   M0   L0       # Outcome Date GA Lbr Varinder/2nd Weight Sex Delivery Anes PTL Lv   3 Current            2 SAB 2017 5w0d    SAB      1 SAB 2013 5w0d    SAB                  SH: tob neg , EtOH neg, drugs neg  FH: heart dz neg , diabetes pos , cancer pos    General ROS: All systems reviewed and negative except for N      Physical Examination: General appearance - alert, well appearing, and in no distress  Vital signs - /59  Pulse 75  Temp 98.7 °F (37.1 °C) (Oral)   Ht 63\" (160 cm)  Wt 116 lb (52.6 kg)  LMP 2017  BMI 20.55 kg/m2  HEENT: normocephalic, atraumatic, pharynx clear   Neck - supple, no significant adenopathy  Lymphatics - no palpable lymphadenopathy, no hepatosplenomegaly  Chest - clear to auscultation, no wheezes, rales or rhonchi, symmetric air entry  Heart - normal rate, regular rhythm, normal S1, S2, no murmurs, rubs, clicks or gallops, no JVD  Abdomen - soft, no apprec tend, nondistended, no masses or organomegaly  no rebound tenderness noted, bowel sounds normal  Vaginal Exam: cl/thick/ballot, no blood in vault  Extremities - no pedal edema noted, no calf tend  Skin - normal coloration and turgor, no rashes, no suspicious skin lesions noted        Fetal monitoring: FHT's 150's, irreg contractions    Radiology     Assessment 1)IUP " 23 2/7 weeks     2)pelvic pain-?etiology, poss ligament spasms   3) contractions    Plan 1)observe     2)labs      3)blaire Oropeza MD  2017  12:20 AM

## 2017-09-02 NOTE — NURSING NOTE
D/C instructions reviewed with pt. Pt Rogerio and has no questions. Ambulated off unit in stable condition accompanied by mother

## 2017-09-04 LAB
BACTERIA SPEC AEROBE CULT: NORMAL
BACTERIA SPEC AEROBE CULT: NORMAL

## 2017-09-21 ENCOUNTER — APPOINTMENT (OUTPATIENT)
Dept: LAB | Facility: HOSPITAL | Age: 22
End: 2017-09-21

## 2017-09-21 ENCOUNTER — RESULTS ENCOUNTER (OUTPATIENT)
Dept: OBSTETRICS AND GYNECOLOGY | Facility: CLINIC | Age: 22
End: 2017-09-21

## 2017-09-21 ENCOUNTER — ROUTINE PRENATAL (OUTPATIENT)
Dept: OBSTETRICS AND GYNECOLOGY | Facility: CLINIC | Age: 22
End: 2017-09-21

## 2017-09-21 ENCOUNTER — LAB (OUTPATIENT)
Dept: LAB | Facility: HOSPITAL | Age: 22
End: 2017-09-21
Attending: OBSTETRICS & GYNECOLOGY

## 2017-09-21 VITALS — WEIGHT: 131 LBS | SYSTOLIC BLOOD PRESSURE: 110 MMHG | BODY MASS INDEX: 23.21 KG/M2 | DIASTOLIC BLOOD PRESSURE: 64 MMHG

## 2017-09-21 DIAGNOSIS — N89.8 VAGINAL ITCHING: ICD-10-CM

## 2017-09-21 DIAGNOSIS — Z34.82 PRENATAL CARE, SUBSEQUENT PREGNANCY, SECOND TRIMESTER: Primary | ICD-10-CM

## 2017-09-21 DIAGNOSIS — Z36.0 SCREENING FOR CHROMOSOMAL ANOMALIES BY AMNIOCENTESIS: Primary | ICD-10-CM

## 2017-09-21 DIAGNOSIS — Z86.19 HISTORY OF HERPES GENITALIS: ICD-10-CM

## 2017-09-21 DIAGNOSIS — Z36.9 ANTENATAL SCREENING ENCOUNTER: ICD-10-CM

## 2017-09-21 LAB
GLUCOSE 1H P 100 G GLC PO SERPL-MCNC: 92 MG/DL (ref 65–199)
GLUCOSE BLDC-MCNC: 100 MG/DL (ref 75–125)
WET PREP GENITAL: NEGATIVE

## 2017-09-21 PROCEDURE — 99213 OFFICE O/P EST LOW 20 MIN: CPT | Performed by: OBSTETRICS & GYNECOLOGY

## 2017-09-21 PROCEDURE — 36415 COLL VENOUS BLD VENIPUNCTURE: CPT | Performed by: OBSTETRICS & GYNECOLOGY

## 2017-09-21 PROCEDURE — 82962 GLUCOSE BLOOD TEST: CPT | Performed by: OBSTETRICS & GYNECOLOGY

## 2017-09-21 PROCEDURE — 87210 SMEAR WET MOUNT SALINE/INK: CPT | Performed by: OBSTETRICS & GYNECOLOGY

## 2017-09-21 PROCEDURE — 82950 GLUCOSE TEST: CPT | Performed by: OBSTETRICS & GYNECOLOGY

## 2017-09-21 RX ORDER — METRONIDAZOLE 7.5 MG/G
GEL VAGINAL
Refills: 0 | COMMUNITY
Start: 2017-08-26 | End: 2017-09-23 | Stop reason: HOSPADM

## 2017-09-21 RX ORDER — ERYTHROMYCIN AND BENZOYL PEROXIDE 30; 50 MG/G; MG/G
GEL TOPICAL
Qty: 46.6 G | Refills: 2 | Status: SHIPPED | OUTPATIENT
Start: 2017-09-21 | End: 2017-12-07

## 2017-09-21 NOTE — PROGRESS NOTES
No results found for: ABORH, LABANTI, ABID    Chief Complaint   Patient presents with   • Routine Prenatal Visit     C/O Vaginal yeast infection. Went to hospital 2 wks ago for contractions       Today Chasity complains of irregular contractions  and vaginal itching  See ob flowsheet for physical exam.    Prenatal Assessment  Fetal Heart Rate: 154  Movement: Present  Prenatal Vitals  BP: 110/64  Weight: 131 lb (59.4 kg)  Urine Glucose/Protein  Urine Glucose: Negative  Urine Protein: Trace  Edema  LLE Edema: None  RLE Edema: None  Facial Edema: None     Tests done today: GCT, Wet mount  At the time of the next visit, she will need to have none    Impression:   Encounter Diagnoses   Name Primary?   • Prenatal care, subsequent pregnancy, second trimester Yes   • History of herpes genitalis    • Vaginal itching        Plan: return 1 weeks, wet mount is negative, Lidex cream    This note was electronically signed.    Hakeem Retana MD

## 2017-09-23 ENCOUNTER — HOSPITAL ENCOUNTER (OUTPATIENT)
Facility: HOSPITAL | Age: 22
Discharge: HOME | End: 2017-09-23
Attending: OBSTETRICS & GYNECOLOGY | Admitting: OBSTETRICS & GYNECOLOGY

## 2017-09-23 VITALS
BODY MASS INDEX: 21.83 KG/M2 | DIASTOLIC BLOOD PRESSURE: 59 MMHG | TEMPERATURE: 97.8 F | SYSTOLIC BLOOD PRESSURE: 109 MMHG | RESPIRATION RATE: 18 BRPM | HEART RATE: 83 BPM | WEIGHT: 131 LBS | HEIGHT: 65 IN

## 2017-09-23 DIAGNOSIS — Z3A.24 24 WEEKS GESTATION OF PREGNANCY: Primary | ICD-10-CM

## 2017-09-23 PROBLEM — O26.893 VAGINAL DISCHARGE DURING PREGNANCY IN THIRD TRIMESTER: Status: ACTIVE | Noted: 2017-09-23

## 2017-09-23 PROBLEM — N89.8 VAGINAL DISCHARGE DURING PREGNANCY IN THIRD TRIMESTER: Status: ACTIVE | Noted: 2017-09-23

## 2017-09-23 LAB
BACTERIA UR QL AUTO: ABNORMAL /HPF
BILIRUB UR QL STRIP: NEGATIVE
CLARITY UR: CLEAR
COLOR UR: YELLOW
GLUCOSE UR STRIP-MCNC: NEGATIVE MG/DL
HGB UR QL STRIP.AUTO: NEGATIVE
HYALINE CASTS UR QL AUTO: ABNORMAL /LPF
KETONES UR QL STRIP: NEGATIVE
LEUKOCYTE ESTERASE UR QL STRIP.AUTO: ABNORMAL
NITRITE UR QL STRIP: NEGATIVE
PH UR STRIP.AUTO: 6.5 [PH] (ref 5–8)
PROT UR QL STRIP: NEGATIVE
RBC # UR: ABNORMAL /HPF
REF LAB TEST METHOD: ABNORMAL
SP GR UR STRIP: 1.02 (ref 1–1.03)
SQUAMOUS #/AREA URNS HPF: ABNORMAL /HPF
UROBILINOGEN UR QL STRIP: ABNORMAL
WBC UR QL AUTO: ABNORMAL /HPF

## 2017-09-23 PROCEDURE — G0463 HOSPITAL OUTPT CLINIC VISIT: HCPCS

## 2017-09-23 PROCEDURE — 81001 URINALYSIS AUTO W/SCOPE: CPT | Performed by: OBSTETRICS & GYNECOLOGY

## 2017-09-23 PROCEDURE — 99213 OFFICE O/P EST LOW 20 MIN: CPT | Performed by: OBSTETRICS & GYNECOLOGY

## 2017-09-23 RX ORDER — CLOTRIMAZOLE 1 %
1 CREAM WITH APPLICATOR VAGINAL NIGHTLY
Status: DISCONTINUED | OUTPATIENT
Start: 2017-09-23 | End: 2017-09-23 | Stop reason: HOSPADM

## 2017-09-23 NOTE — H&P
2017  3:10 PM  [place]  Laborist    22-year-old G1 at 26 weeks and 2 days reports with complaints of vaginal discharge and a little pain.  Good fetal movement.  No vaginal bleeding.    Benign prenatal course.  Previously treated for yeast infections.    Negative medical history  History tonsillectomy    Vital signs stable afebrile    Reassuring fetal heart rate tracing  No contractions on monitor  Cervix closed thick and high  Vaginal discharge consistent with yeast    Assessment/plan  26 week 2 day IUP  Reassuring fetal status  No evidence of  labor  Vaginal candidiasis  Treated with Lotrimin  Follow-up with primary OB as scheduled

## 2017-09-23 NOTE — NURSING NOTE
D/c instructions reviewed with patient. Hand written script given to pt. For lotrimin per Dr. Helm. All questions are answered. Pt. Is able to v/u. D/c'd to home in good condition with family

## 2017-09-26 ENCOUNTER — TELEPHONE (OUTPATIENT)
Dept: OBSTETRICS AND GYNECOLOGY | Facility: CLINIC | Age: 22
End: 2017-09-26

## 2017-09-26 NOTE — TELEPHONE ENCOUNTER
Pt called stating she has a bad yeast infection and would like a Rx called in because she does not have any money to buy an over the counter medication.  Will call in Terazol cream

## 2017-09-29 ENCOUNTER — ROUTINE PRENATAL (OUTPATIENT)
Dept: OBSTETRICS AND GYNECOLOGY | Facility: CLINIC | Age: 22
End: 2017-09-29

## 2017-09-29 VITALS — BODY MASS INDEX: 22.3 KG/M2 | WEIGHT: 134 LBS | DIASTOLIC BLOOD PRESSURE: 70 MMHG | SYSTOLIC BLOOD PRESSURE: 110 MMHG

## 2017-09-29 DIAGNOSIS — N89.8 VAGINAL DISCHARGE DURING PREGNANCY IN SECOND TRIMESTER: ICD-10-CM

## 2017-09-29 DIAGNOSIS — O26.22: Primary | ICD-10-CM

## 2017-09-29 DIAGNOSIS — F41.8 DEPRESSION WITH ANXIETY: ICD-10-CM

## 2017-09-29 DIAGNOSIS — O26.892 VAGINAL DISCHARGE DURING PREGNANCY IN SECOND TRIMESTER: ICD-10-CM

## 2017-09-29 PROCEDURE — 99213 OFFICE O/P EST LOW 20 MIN: CPT | Performed by: OBSTETRICS & GYNECOLOGY

## 2017-10-09 ENCOUNTER — TELEPHONE (OUTPATIENT)
Dept: OBSTETRICS AND GYNECOLOGY | Facility: CLINIC | Age: 22
End: 2017-10-09

## 2017-10-09 RX ORDER — METRONIDAZOLE 500 MG/1
500 TABLET ORAL 2 TIMES DAILY
Qty: 14 TABLET | Refills: 1 | Status: SHIPPED | OUTPATIENT
Start: 2017-10-09 | End: 2017-10-16

## 2017-10-09 NOTE — TELEPHONE ENCOUNTER
Pt. Called requesting results for her panel she had done.  Will call in Flagyl to her pharmacy on file.

## 2017-10-18 ENCOUNTER — ROUTINE PRENATAL (OUTPATIENT)
Dept: OBSTETRICS AND GYNECOLOGY | Facility: CLINIC | Age: 22
End: 2017-10-18

## 2017-10-18 VITALS — DIASTOLIC BLOOD PRESSURE: 72 MMHG | SYSTOLIC BLOOD PRESSURE: 110 MMHG | BODY MASS INDEX: 22.8 KG/M2 | WEIGHT: 137 LBS

## 2017-10-18 DIAGNOSIS — N89.8 VAGINAL DISCHARGE DURING PREGNANCY IN THIRD TRIMESTER: ICD-10-CM

## 2017-10-18 DIAGNOSIS — O36.8131 DECREASED FETAL MOVEMENTS IN THIRD TRIMESTER, FETUS 1 OF MULTIPLE GESTATION: ICD-10-CM

## 2017-10-18 DIAGNOSIS — O26.893 VAGINAL DISCHARGE DURING PREGNANCY IN THIRD TRIMESTER: ICD-10-CM

## 2017-10-18 DIAGNOSIS — Z34.83 PRENATAL CARE, SUBSEQUENT PREGNANCY, THIRD TRIMESTER: Primary | ICD-10-CM

## 2017-10-18 PROCEDURE — 99213 OFFICE O/P EST LOW 20 MIN: CPT | Performed by: OBSTETRICS & GYNECOLOGY

## 2017-10-18 RX ORDER — INFLUENZA VIRUS VACCINE 15; 15; 15; 15 UG/.5ML; UG/.5ML; UG/.5ML; UG/.5ML
SUSPENSION INTRAMUSCULAR
Refills: 0 | COMMUNITY
Start: 2017-09-22 | End: 2017-12-07

## 2017-10-18 RX ORDER — CLOTRIMAZOLE 1 %
CREAM WITH APPLICATOR VAGINAL
Refills: 0 | COMMUNITY
Start: 2017-09-27 | End: 2017-12-07

## 2017-10-18 NOTE — PROGRESS NOTES
No results found for: ABORH, LABANTI, ABID    Chief Complaint   Patient presents with   • Routine Prenatal Visit     Patient states a box fell on her at home , and since baby has not moved as much.       Today Chasity complains of A box following from a closet and hitting her in the abdomen.  The patient says the baby is moving a little more now.  The NST was suggested but the patient refused and is unable to stay for the procedure.  See ob flowsheet for physical exam.    Prenatal Assessment  Fetal Heart Rate: 144  Fundal Height (cm): 29 cm  Movement: Present  Presentation: Vertex  Prenatal Vitals  BP: 110/72  Weight: 137 lb (62.1 kg)  Urine Glucose/Protein  Urine Glucose: Negative  Urine Protein: Trace  Vaginal Drainage  Draining Fluid: Yes  Edema  LLE Edema: None  RLE Edema: None  Facial Edema: None     Tests done today: Patient refused NSTs and no test was done  At the time of the next visit, she will need to have none    Impression:   Encounter Diagnoses   Name Primary?   • Prenatal care, subsequent pregnancy, third trimester Yes   • Decreased fetal movements in third trimester, fetus 1 of multiple gestation    • Vaginal discharge during pregnancy in third trimester        Plan: return 2 weeks,He should think she has a yeast infection, her insurance does not cover Terazol, since she will treat with Monistat over-the-counter.    This note was electronically signed.    Hakeem Retana MD

## 2017-10-25 ENCOUNTER — HOSPITAL ENCOUNTER (OUTPATIENT)
Facility: HOSPITAL | Age: 22
Setting detail: OBSERVATION
Discharge: HOME | End: 2017-10-26
Attending: OBSTETRICS & GYNECOLOGY | Admitting: OBSTETRICS & GYNECOLOGY

## 2017-10-25 PROCEDURE — G0378 HOSPITAL OBSERVATION PER HR: HCPCS

## 2017-10-26 VITALS
HEART RATE: 86 BPM | BODY MASS INDEX: 24.1 KG/M2 | SYSTOLIC BLOOD PRESSURE: 115 MMHG | HEIGHT: 63 IN | TEMPERATURE: 98.3 F | RESPIRATION RATE: 18 BRPM | DIASTOLIC BLOOD PRESSURE: 59 MMHG | WEIGHT: 136 LBS

## 2017-10-26 LAB
BACTERIA UR QL AUTO: ABNORMAL /HPF
BILIRUB UR QL STRIP: NEGATIVE
CLARITY UR: ABNORMAL
COLOR UR: YELLOW
GLUCOSE UR STRIP-MCNC: NEGATIVE MG/DL
HGB UR QL STRIP.AUTO: NEGATIVE
HYALINE CASTS UR QL AUTO: ABNORMAL /LPF
KETONES UR QL STRIP: ABNORMAL
LEUKOCYTE ESTERASE UR QL STRIP.AUTO: ABNORMAL
MUCOUS THREADS URNS QL MICRO: ABNORMAL /HPF
NITRITE UR QL STRIP: NEGATIVE
PH UR STRIP.AUTO: 6.5 [PH] (ref 5–8)
PROT UR QL STRIP: ABNORMAL
RBC # UR: ABNORMAL /HPF
REF LAB TEST METHOD: ABNORMAL
SP GR UR STRIP: 1.03 (ref 1–1.03)
SQUAMOUS #/AREA URNS HPF: ABNORMAL /HPF
UROBILINOGEN UR QL STRIP: ABNORMAL
WBC UR QL AUTO: ABNORMAL /HPF

## 2017-10-26 PROCEDURE — 59025 FETAL NON-STRESS TEST: CPT

## 2017-10-26 PROCEDURE — 81001 URINALYSIS AUTO W/SCOPE: CPT | Performed by: OBSTETRICS & GYNECOLOGY

## 2017-10-26 PROCEDURE — 87086 URINE CULTURE/COLONY COUNT: CPT | Performed by: OBSTETRICS & GYNECOLOGY

## 2017-10-26 PROCEDURE — 99218 PR INITIAL OBSERVATION CARE/DAY 30 MINUTES: CPT | Performed by: OBSTETRICS & GYNECOLOGY

## 2017-10-26 PROCEDURE — 59025 FETAL NON-STRESS TEST: CPT | Performed by: OBSTETRICS & GYNECOLOGY

## 2017-10-26 PROCEDURE — G0378 HOSPITAL OBSERVATION PER HR: HCPCS

## 2017-10-26 NOTE — PROGRESS NOTES
[place]  Obstetric History and Physical    Chief Complaint   Patient presents with   • Abdominal Pain       Subjective     Patient is a 22 y.o. female  currently at 31w0d, who presents with C/O abdominal pain.  Describes constant  Mild lower abdominal pressure throughout the day without associated leaking of fluid or vaginal bleeding.  Patient reports normal fetal activity.  Patient denies any other associated symptoms.  Prenatal course uncomplicated with Dr. Sharif..    Her prenatal care is complicated by  none  Her previous obstetric/gynecological history is noted for is remarkable for .    The following portions of the patients history were reviewed and updated as appropriate: current medications, allergies, past medical history, past surgical history, past family history, past social history and problem list .       Prenatal Information:   Maternal Prenatal Labs  Blood Type No results found for: ABO   Rh Status No results found for: RH   Antibody Screen No results found for: ABSCRN   Gonnorhea No results found for: GCCX   Chlamydia No results found for: CLAMYDCU   RPR No results found for: RPR   Syphilis Antibody No results found for: SYPHILIS   Rubella No results found for: RUBELLAIGGIN   Hepatitis B Surface Antigen No results found for: HEPBSAG   HIV-1 Antibody No results found for: LABHIV1   Hepatitis C Antibody No results found for: HEPCAB   Rapid [place]rin Drug Screen No results found for: AMPMETHU, BARBITSCNUR, LABBENZSCN, LABMETHSCN, LABOPIASCN, THCURSCR, COCAINEUR, AMPHETSCREEN, PROPOXSCN, BUPRENORSCNU, METAMPSCNUR, OXYCODONESCN, TRICYCLICSCN   Group B Strep Culture No results found for: GBSANTIGEN                 Past OB History:       Obstetric History       T0      L0     SAB0   TAB0   Ectopic0   Multiple0   Live Births0       # Outcome Date GA Lbr Varinder/2nd Weight Sex Delivery Anes PTL Lv   3 Current            2 SAB 2017 5w0d    SAB      1 SAB 2013 5w0d    SAB              Past Medical History: Past Medical History:   Diagnosis Date   • Anxiety    • Depression    • Genital herpes    • Scoliosis     Diagnosed at age 12      Past Surgical History Past Surgical History:   Procedure Laterality Date   • TONSILLECTOMY     • WISDOM TOOTH EXTRACTION        Family History: Family History   Problem Relation Age of Onset   • Bipolar disorder Mother    • No Known Problems Father    • Diabetes Maternal Grandfather    • Diabetes Maternal Aunt    • Breast cancer Neg Hx    • Ovarian cancer Neg Hx    • Colon cancer Neg Hx       Social History:  reports that she quit smoking about 4 months ago. She has never used smokeless tobacco.   reports that she does not drink alcohol.   reports that she does not use illicit drugs.                   General ROS Negative Findings:Headaches, Visual Changes, Epigastric pain, Anorexia, Nausia/Vomiting, ROM and Vaginal Bleeding    ROS      Objective       Vital Signs Range for the last 24 hours  Temperature: Temp:  [98.3 °F (36.8 °C)] 98.3 °F (36.8 °C)   Temp Source: Temp src: Oral   BP: BP: (115)/(59) 115/59   Pulse: Heart Rate:  [86] 86   Respirations: Resp:  [16-18] 18   SPO2:     O2 Amount (l/min):     O2 Devices     Weight: Weight:  [61.7 kg (136 lb)] 61.7 kg (136 lb)     Physical Examination:   General:   alert, appears stated age and cooperative   Skin:   normal   HEENT:     Lungs:   clear to auscultation bilaterally   Heart:   regular rate and rhythm, S1, S2 normal, no murmur, click, rub or gallop   Abdomen:  soft, gravid uterus under, no guarding, rebound, negative CVA tenderness.     Lower Extremeties Tr edema, Calf tenderness, DTRs 2+ over 4 no clonus    Pelvis:  External genitalia: normal general appearance  Uterus: enlarged         Presentation: vtx   Cervix: Exam by:     Dilation: Dilation: 0   Effacement:  50%   Station: Station: -3       Fetal Heart Rate Assessment   Method: Fetal HR Assessment Method: external   Beats/min:     Baseline:      Varibility:     Accels:     Decels:     Tracing Category:     NST indications, abdominal pain, reactive, moderate variability, accelerations present 15 x 15, no decelerations, occasional contraction, onset 12:10 AM offset 1:30 AM  Uterine Assessment   Method: Method: palpation   Frequency (min):     Ctx Count in 10 min:     Duration:     Intensity:     Intensity by IUPC:     Resting Tone:     Resting Tone by IUPC:     [place] Units:       Laboratory Results: @[id number]@         Dehydration   1+ ju cult pending  Radiology Review:@lastrad@  Other Studies:    Assessment/Plan     Active Problems:    Pregnancy        Assessment:  1.  Intrauterine pregnancy at 31w0d weeks gestation with reactive fetal status.    2.  No evidence of labor or rupture membranes  3.   4.      Plan:  1. discharge to home,PTL precautions, F/U OB routine or PRN.  2. Plan of care has been reviewed with patient.  3.  Risks, benefits of treatment plan have been discussed.  4.  All questions have been answered.  5      Pedro Pablo Martell DO  10/26/2017  1:33 AM

## 2017-10-28 LAB
BACTERIA SPEC AEROBE CULT: NORMAL
BACTERIA SPEC AEROBE CULT: NORMAL

## 2017-11-02 ENCOUNTER — ROUTINE PRENATAL (OUTPATIENT)
Dept: OBSTETRICS AND GYNECOLOGY | Facility: CLINIC | Age: 22
End: 2017-11-02

## 2017-11-02 VITALS — WEIGHT: 142 LBS | DIASTOLIC BLOOD PRESSURE: 70 MMHG | SYSTOLIC BLOOD PRESSURE: 120 MMHG | BODY MASS INDEX: 25.15 KG/M2

## 2017-11-02 DIAGNOSIS — F32.A DEPRESSION AFFECTING PREGNANCY IN THIRD TRIMESTER, ANTEPARTUM: ICD-10-CM

## 2017-11-02 DIAGNOSIS — O99.343 DEPRESSION AFFECTING PREGNANCY IN THIRD TRIMESTER, ANTEPARTUM: ICD-10-CM

## 2017-11-02 DIAGNOSIS — Z34.83 PRENATAL CARE, SUBSEQUENT PREGNANCY, THIRD TRIMESTER: Primary | ICD-10-CM

## 2017-11-02 PROCEDURE — 99213 OFFICE O/P EST LOW 20 MIN: CPT | Performed by: OBSTETRICS & GYNECOLOGY

## 2017-11-02 NOTE — PROGRESS NOTES
No results found for: ABORH, LABANTI, ABID    Chief Complaint   Patient presents with   • Routine Prenatal Visit     c/o emotional swings       Today Chasity complains of depression  See ob flowsheet for physical exam.    Prenatal Assessment  Fetal Heart Rate: 146  Movement: Present  Prenatal Vitals  BP: 120/70  Weight: 142 lb (64.4 kg)  Urine Glucose/Protein  Urine Glucose: Negative  Urine Protein: Trace     Tests done today: none  At the time of the next visit, she will need to have none    Impression:   Encounter Diagnoses   Name Primary?   • Prenatal care, subsequent pregnancy, third trimester Yes   • Depression affecting pregnancy in third trimester, antepartum        Plan: Patient has been off Zoloft but will restart it, return 2 weeks    This note was electronically signed.    Hakeem Retana MD

## 2017-11-11 ENCOUNTER — HOSPITAL ENCOUNTER (OUTPATIENT)
Facility: HOSPITAL | Age: 22
Setting detail: OBSERVATION
Discharge: HOME | End: 2017-11-12
Attending: OBSTETRICS & GYNECOLOGY | Admitting: OBSTETRICS & GYNECOLOGY

## 2017-11-11 VITALS
TEMPERATURE: 98.2 F | RESPIRATION RATE: 18 BRPM | HEART RATE: 78 BPM | SYSTOLIC BLOOD PRESSURE: 101 MMHG | BODY MASS INDEX: 25.52 KG/M2 | HEIGHT: 63 IN | DIASTOLIC BLOOD PRESSURE: 58 MMHG | WEIGHT: 144 LBS

## 2017-11-11 LAB
ALBUMIN SERPL-MCNC: 3.6 G/DL (ref 3.2–4.8)
ALBUMIN/GLOB SERPL: 1.3 G/DL (ref 1.5–2.5)
ALP SERPL-CCNC: 119 U/L (ref 25–100)
ALT SERPL W P-5'-P-CCNC: 12 U/L (ref 7–40)
ANION GAP SERPL CALCULATED.3IONS-SCNC: 4 MMOL/L (ref 3–11)
AST SERPL-CCNC: 14 U/L (ref 0–33)
BACTERIA UR QL AUTO: ABNORMAL /HPF
BILIRUB SERPL-MCNC: 0.3 MG/DL (ref 0.3–1.2)
BILIRUB UR QL STRIP: NEGATIVE
BUN BLD-MCNC: 10 MG/DL (ref 9–23)
BUN/CREAT SERPL: 25 (ref 7–25)
CALCIUM SPEC-SCNC: 8.9 MG/DL (ref 8.7–10.4)
CHLORIDE SERPL-SCNC: 110 MMOL/L (ref 99–109)
CLARITY UR: ABNORMAL
CO2 SERPL-SCNC: 23 MMOL/L (ref 20–31)
COLOR UR: YELLOW
CREAT BLD-MCNC: 0.4 MG/DL (ref 0.6–1.3)
DEPRECATED RDW RBC AUTO: 40.2 FL (ref 37–54)
ERYTHROCYTE [DISTWIDTH] IN BLOOD BY AUTOMATED COUNT: 14.7 % (ref 11.3–14.5)
GFR SERPL CREATININE-BSD FRML MDRD: >150 ML/MIN/1.73
GLOBULIN UR ELPH-MCNC: 2.8 GM/DL
GLUCOSE BLD-MCNC: 99 MG/DL (ref 70–100)
GLUCOSE UR STRIP-MCNC: NEGATIVE MG/DL
HCT VFR BLD AUTO: 27.9 % (ref 34.5–44)
HGB BLD-MCNC: 9.2 G/DL (ref 11.5–15.5)
HGB UR QL STRIP.AUTO: NEGATIVE
HYALINE CASTS UR QL AUTO: ABNORMAL /LPF
KETONES UR QL STRIP: NEGATIVE
LEUKOCYTE ESTERASE UR QL STRIP.AUTO: ABNORMAL
MCH RBC QN AUTO: 24.8 PG (ref 27–31)
MCHC RBC AUTO-ENTMCNC: 33 G/DL (ref 32–36)
MCV RBC AUTO: 75.2 FL (ref 80–99)
NITRITE UR QL STRIP: NEGATIVE
PH UR STRIP.AUTO: 7.5 [PH] (ref 5–8)
PLATELET # BLD AUTO: 193 10*3/MM3 (ref 150–450)
PMV BLD AUTO: 11.9 FL (ref 6–12)
POTASSIUM BLD-SCNC: 3.6 MMOL/L (ref 3.5–5.5)
PROT SERPL-MCNC: 6.4 G/DL (ref 5.7–8.2)
PROT UR QL STRIP: NEGATIVE
RBC # BLD AUTO: 3.71 10*6/MM3 (ref 3.89–5.14)
RBC # UR: ABNORMAL /HPF
REF LAB TEST METHOD: ABNORMAL
SODIUM BLD-SCNC: 137 MMOL/L (ref 132–146)
SP GR UR STRIP: 1.02 (ref 1–1.03)
SQUAMOUS #/AREA URNS HPF: ABNORMAL /HPF
UROBILINOGEN UR QL STRIP: ABNORMAL
WBC NRBC COR # BLD: 10.97 10*3/MM3 (ref 3.5–10.8)
WBC UR QL AUTO: ABNORMAL /HPF

## 2017-11-11 PROCEDURE — 85027 COMPLETE CBC AUTOMATED: CPT | Performed by: OBSTETRICS & GYNECOLOGY

## 2017-11-11 PROCEDURE — G0378 HOSPITAL OBSERVATION PER HR: HCPCS

## 2017-11-11 PROCEDURE — 81001 URINALYSIS AUTO W/SCOPE: CPT | Performed by: OBSTETRICS & GYNECOLOGY

## 2017-11-11 PROCEDURE — 80053 COMPREHEN METABOLIC PANEL: CPT | Performed by: OBSTETRICS & GYNECOLOGY

## 2017-11-11 PROCEDURE — 59025 FETAL NON-STRESS TEST: CPT

## 2017-11-12 PROBLEM — O26.813 FATIGUE DURING PREGNANCY IN THIRD TRIMESTER: Status: ACTIVE | Noted: 2017-11-12

## 2017-11-12 PROCEDURE — 59025 FETAL NON-STRESS TEST: CPT | Performed by: OBSTETRICS & GYNECOLOGY

## 2017-11-12 PROCEDURE — 99218 PR INITIAL OBSERVATION CARE/DAY 30 MINUTES: CPT | Performed by: OBSTETRICS & GYNECOLOGY

## 2017-11-12 PROCEDURE — G0378 HOSPITAL OBSERVATION PER HR: HCPCS

## 2017-11-12 NOTE — NURSING NOTE
D/c instructions reviewed with patient. Pt states that she understands instructions and denies any questions at this time. Pt d/c home with her cousin. Pt's cousin escorted pt off unit in wheelchair.

## 2017-11-12 NOTE — H&P
"Chasity Pedro Pablo  1995  [id number]  [id number]    CC: ache all over  HPI:  Patient is 22 y.o. black female   currently at 33w3d  Presents with c/o feeling tired, achy all over, pain in ribs where baby is.  Pt denies fever, has occas N, rarely V.  Appetite and food intake WNL.  Good FM.  BPNC to date.  No ill family members, one employee with recent \"flu\".  Pt has had flu vaccine.    PMH:   Current meds PNV, valtrex bid, zoloft  Illnesses depression  Surgeries T and A, oral surg  Allergies PCN-hives       Latex-+rast    Past OB History:       Obstetric History       T0      L0     SAB0   TAB0   Ectopic0   Multiple0   Live Births0       # Outcome Date GA Lbr Varinder/2nd Weight Sex Delivery Anes PTL Lv   3 Current            2 2017 5w0d    SAB      1 SAB 2013 5w0d    SAB                  SH: tob neg , EtOH neg, drugs neg  FH: heart dz neg , diabetes pos , cancer pos    General ROS: All systems reviewed and negative except for N,edema      Physical Examination: General appearance - alert, well appearing, and in no distress  Vital signs - /58 (BP Location: Right arm, Patient Position: Lying)  Pulse 78  Temp 98.2 °F (36.8 °C) (Oral)   Resp 18  Ht 63\" (160 cm)  Wt 144 lb (65.3 kg)  LMP 2017  BMI 25.51 kg/m2  HEENT: normocephalic, atraumatic, pharynx clear   Neck - supple, no significant adenopathy  Lymphatics - no palpable lymphadenopathy, no hepatosplenomegaly  Chest - clear to auscultation, no wheezes, rales or rhonchi, symmetric air entry  Heart - normal rate, regular rhythm, normal S1, S2, no murmurs, rubs, clicks or gallops, no JVD  Abdomen - soft, nontender, nondistended, no masses or organomegaly  no rebound tenderness noted, bowel sounds normal  Vaginal Exam: deferred  Extremities - no pedal edema noted, no calf tend  Skin - normal coloration and turgor, no rashes, no suspicious skin lesions noted      Labs:    Results from last 7 days  Lab Units 17  2301   WBC " 10*3/mm3 10.97*   HEMOGLOBIN g/dL 9.2*   HEMATOCRIT % 27.9*   PLATELETS 10*3/mm3 193       Results from last 7 days  Lab Units 11/11/17  2301   SODIUM mmol/L 137   POTASSIUM mmol/L 3.6   CHLORIDE mmol/L 110*   CO2 mmol/L 23.0   BUN mg/dL 10   CREATININE mg/dL 0.40*   CALCIUM mg/dL 8.9   BILIRUBIN mg/dL 0.3   ALK PHOS U/L 119*   ALT (SGPT) U/L 12   AST (SGOT) U/L 14   GLUCOSE mg/dL 99       Fetal monitoring: indication feeling poorly , onset 2240 , offset 0008 , baseline 130 , mod BTB variability , multiple accels (15 X 15), no decels, rare contractions, interpretation reactive NST    Radiology     Assessment 1)IUP 33 3/7 weeks    2)fatigue     3)poss early viral syndrome (all labs nl)   4)anemia    Plan 1)observe     2)home     3)keep next sched appt    Bry Oropeza MD  11/12/2017  12:21 AM

## 2017-11-12 NOTE — DISCHARGE INSTRUCTIONS
Follow-up with Dr. Retana like you are scheduled this week.  Call his office with any questions or concerns.  Please return to the hospital if you think your water has broken, if you have vaginal bleeding, or you are having regular contractions.

## 2017-11-16 ENCOUNTER — ROUTINE PRENATAL (OUTPATIENT)
Dept: OBSTETRICS AND GYNECOLOGY | Facility: CLINIC | Age: 22
End: 2017-11-16

## 2017-11-16 VITALS — DIASTOLIC BLOOD PRESSURE: 70 MMHG | SYSTOLIC BLOOD PRESSURE: 112 MMHG | BODY MASS INDEX: 24.98 KG/M2 | WEIGHT: 141 LBS

## 2017-11-16 DIAGNOSIS — Z34.83 PRENATAL CARE, SUBSEQUENT PREGNANCY, THIRD TRIMESTER: Primary | ICD-10-CM

## 2017-11-16 DIAGNOSIS — N89.8 VAGINAL DISCHARGE DURING PREGNANCY IN THIRD TRIMESTER: ICD-10-CM

## 2017-11-16 DIAGNOSIS — O26.893 VAGINAL DISCHARGE DURING PREGNANCY IN THIRD TRIMESTER: ICD-10-CM

## 2017-11-16 PROCEDURE — 99213 OFFICE O/P EST LOW 20 MIN: CPT | Performed by: OBSTETRICS & GYNECOLOGY

## 2017-11-16 NOTE — PROGRESS NOTES
No results found for: ABORH, LABANTI, ABID    Chief Complaint   Patient presents with   • Routine Prenatal Visit     Pt. complains of having a vaginal odor again.  She took flagyl previously and it did help but it's back again.        Today Chasity complains of vaginal discharge  See ob flowsheet for physical exam.    Prenatal Assessment  Fetal Heart Rate: 152  Movement: Present  Prenatal Vitals  BP: 112/70  Weight: 141 lb (64 kg)  Urine Glucose/Protein  Urine Glucose: Negative  Urine Protein: Negative     Tests done today: none  At the time of the next visit, she will need to have none    Impression:   Encounter Diagnoses   Name Primary?   • Prenatal care, subsequent pregnancy, third trimester Yes       Plan: Terazol cream, return 1 week    This note was electronically signed.    Hakeem Retana MD

## 2017-11-21 ENCOUNTER — ROUTINE PRENATAL (OUTPATIENT)
Dept: OBSTETRICS AND GYNECOLOGY | Facility: CLINIC | Age: 22
End: 2017-11-21

## 2017-11-21 VITALS — BODY MASS INDEX: 25.33 KG/M2 | DIASTOLIC BLOOD PRESSURE: 60 MMHG | SYSTOLIC BLOOD PRESSURE: 100 MMHG | WEIGHT: 143 LBS

## 2017-11-21 DIAGNOSIS — O26.893 PELVIC PAIN AFFECTING PREGNANCY IN THIRD TRIMESTER, ANTEPARTUM: ICD-10-CM

## 2017-11-21 DIAGNOSIS — Z34.83 PRENATAL CARE, SUBSEQUENT PREGNANCY, THIRD TRIMESTER: ICD-10-CM

## 2017-11-21 DIAGNOSIS — R10.2 PELVIC PAIN AFFECTING PREGNANCY IN THIRD TRIMESTER, ANTEPARTUM: ICD-10-CM

## 2017-11-21 DIAGNOSIS — O36.8130 DECREASED FETAL MOVEMENTS IN THIRD TRIMESTER, SINGLE OR UNSPECIFIED FETUS: Primary | ICD-10-CM

## 2017-11-21 PROCEDURE — 99213 OFFICE O/P EST LOW 20 MIN: CPT | Performed by: OBSTETRICS & GYNECOLOGY

## 2017-11-21 PROCEDURE — 59025 FETAL NON-STRESS TEST: CPT | Performed by: OBSTETRICS & GYNECOLOGY

## 2017-11-21 NOTE — PROGRESS NOTES
No results found for: ABORH, LABANTI, ABID    Chief Complaint   Patient presents with   • Routine Prenatal Visit     c/o cold symptoms, Temp 103 last night , c/o hands and feet swelling.   • Contractions     c/o contractions , more last night than today   • Decreased Fetal Movement     moving but not as much       Today Chasity complains of fever, contractions with pelvic pain, and decreased fetal movement  See ob flowsheet for physical exam.    Prenatal Assessment  Fetal Heart Rate: 136  Movement: Present  Prenatal Vitals  BP: 100/60  Weight: 143 lb (64.9 kg)  Urine Glucose/Protein  Urine Glucose: Negative  Urine Protein: Negative     Tests done today: NST - reactive  At the time of the next visit, she will need to have none    Impression:   Encounter Diagnoses   Name Primary?   • Decreased fetal movements in third trimester, single or unspecified fetus Yes   • Prenatal care, subsequent pregnancy, third trimester    • Pelvic pain affecting pregnancy in third trimester, antepartum        Plan: Return 1 week, Tylenol for increased temperature, no cervical change, continued to weekly visits, will do an excuse for work today because of temperature last PM and contractions on the fetal monitor during the NST.  The NST is reactive.    This note was electronically signed.    Hakeem Retana MD

## 2017-11-30 ENCOUNTER — ROUTINE PRENATAL (OUTPATIENT)
Dept: OBSTETRICS AND GYNECOLOGY | Facility: CLINIC | Age: 22
End: 2017-11-30

## 2017-11-30 VITALS — DIASTOLIC BLOOD PRESSURE: 60 MMHG | BODY MASS INDEX: 25.86 KG/M2 | WEIGHT: 146 LBS | SYSTOLIC BLOOD PRESSURE: 100 MMHG

## 2017-11-30 DIAGNOSIS — Z34.83 PRENATAL CARE, SUBSEQUENT PREGNANCY, THIRD TRIMESTER: Primary | ICD-10-CM

## 2017-11-30 DIAGNOSIS — O47.03 PRETERM UTERINE CONTRACTIONS, ANTEPARTUM, THIRD TRIMESTER: ICD-10-CM

## 2017-11-30 DIAGNOSIS — O26.893 PELVIC PAIN AFFECTING PREGNANCY IN THIRD TRIMESTER, ANTEPARTUM: ICD-10-CM

## 2017-11-30 DIAGNOSIS — R10.2 PELVIC PAIN AFFECTING PREGNANCY IN THIRD TRIMESTER, ANTEPARTUM: ICD-10-CM

## 2017-11-30 PROCEDURE — 99213 OFFICE O/P EST LOW 20 MIN: CPT | Performed by: OBSTETRICS & GYNECOLOGY

## 2017-11-30 NOTE — PROGRESS NOTES
No results found for: ABORH, LABANTI, ABID    Chief Complaint   Patient presents with   • Routine Prenatal Visit     Pt. complains of having contractions every 6 to 8 hours since last night, lower back pain as well.       Today Chasity complains of irregular contractions   See ob flowsheet for physical exam.    Prenatal Assessment  Fetal Heart Rate: 144  Movement: Present  Prenatal Vitals  BP: 100/60  Weight: 146 lb (66.2 kg)  Urine Glucose/Protein  Urine Glucose: Negative  Urine Protein: Negative     Tests done today: none  At the time of the next visit, she will need to have none    Impression:   Encounter Diagnoses   Name Primary?   • Prenatal care, subsequent pregnancy, third trimester Yes   • Pelvic pain affecting pregnancy in third trimester, antepartum    •  uterine contractions, antepartum, third trimester        Plan: Return 1 week    This note was electronically signed.    Hakeem Retana MD

## 2017-12-07 ENCOUNTER — ROUTINE PRENATAL (OUTPATIENT)
Dept: OBSTETRICS AND GYNECOLOGY | Facility: CLINIC | Age: 22
End: 2017-12-07

## 2017-12-07 VITALS — WEIGHT: 150 LBS | SYSTOLIC BLOOD PRESSURE: 98 MMHG | DIASTOLIC BLOOD PRESSURE: 68 MMHG | BODY MASS INDEX: 26.57 KG/M2

## 2017-12-07 DIAGNOSIS — Z34.83 PRENATAL CARE, SUBSEQUENT PREGNANCY, THIRD TRIMESTER: Primary | ICD-10-CM

## 2017-12-07 DIAGNOSIS — O36.8390 VARIABLE FETAL HEART RATE DECELERATIONS, ANTEPARTUM: ICD-10-CM

## 2017-12-07 PROCEDURE — 99213 OFFICE O/P EST LOW 20 MIN: CPT | Performed by: OBSTETRICS & GYNECOLOGY

## 2017-12-07 PROCEDURE — 59025 FETAL NON-STRESS TEST: CPT | Performed by: OBSTETRICS & GYNECOLOGY

## 2017-12-07 NOTE — PROGRESS NOTES
No results found for: ABORH, LABANTI, ABID    Chief Complaint   Patient presents with   • Routine Prenatal Visit     reports contractions and cramping x 2 days       Today Chasity complains of irregular contractions   See ob flowsheet for physical exam.    Prenatal Assessment  Fetal Heart Rate: 156  Movement: Present  Prenatal Vitals  BP: 98/68  Weight: 68 kg (150 lb)  Urine Glucose/Protein  Urine Glucose: Negative  Urine Protein: Negative     Tests done today: NST - reactive,  Fetal heart tone deceleration was noted with a contraction with external Doppler.  NST was scheduled to monitor the baby's heart rate and no other decelerations occurred  At the time of the next visit, she will need to have none    Impression:   Encounter Diagnoses   Name Primary?   • Prenatal care, subsequent pregnancy, third trimester Yes       Plan: Return 1 week    This note was electronically signed.    Hakeem Retana MD

## 2017-12-12 RX ORDER — METRONIDAZOLE 500 MG/1
500 TABLET ORAL 2 TIMES DAILY
Qty: 14 TABLET | Refills: 0 | Status: SHIPPED | OUTPATIENT
Start: 2017-12-12 | End: 2017-12-21 | Stop reason: HOSPADM

## 2017-12-14 ENCOUNTER — ROUTINE PRENATAL (OUTPATIENT)
Dept: OBSTETRICS AND GYNECOLOGY | Facility: CLINIC | Age: 22
End: 2017-12-14

## 2017-12-14 VITALS — BODY MASS INDEX: 26.39 KG/M2 | DIASTOLIC BLOOD PRESSURE: 65 MMHG | SYSTOLIC BLOOD PRESSURE: 98 MMHG | WEIGHT: 149 LBS

## 2017-12-14 DIAGNOSIS — Z34.83 PRENATAL CARE, SUBSEQUENT PREGNANCY, THIRD TRIMESTER: Primary | ICD-10-CM

## 2017-12-14 PROCEDURE — 99212 OFFICE O/P EST SF 10 MIN: CPT | Performed by: OBSTETRICS & GYNECOLOGY

## 2017-12-14 NOTE — PROGRESS NOTES
No results found for: ABORH, LABANTI, ABID    Chief Complaint   Patient presents with   • Routine Prenatal Visit       Today Chasity complains of abdominal pain in the lower midline  See ob flowsheet for physical exam.    Prenatal Assessment  Fetal Heart Rate: 166  Movement: Present  Prenatal Vitals  BP: 98/65  Weight: 67.6 kg (149 lb)     Tests done today: none  At the time of the next visit, she will need to have none    Impression:   Encounter Diagnoses   Name Primary?   • Prenatal care, subsequent pregnancy, third trimester Yes       Plan: Return 1 week    This note was electronically signed.    Hakeem Retana MD

## 2017-12-17 ENCOUNTER — HOSPITAL ENCOUNTER (INPATIENT)
Facility: HOSPITAL | Age: 22
LOS: 3 days | Discharge: HOME | End: 2017-12-21
Attending: OBSTETRICS & GYNECOLOGY | Admitting: OBSTETRICS & GYNECOLOGY

## 2017-12-17 DIAGNOSIS — Z98.891 STATUS POST EMERGENCY CESAREAN SECTION: ICD-10-CM

## 2017-12-17 DIAGNOSIS — Z3A.38 38 WEEKS GESTATION OF PREGNANCY: Primary | ICD-10-CM

## 2017-12-18 ENCOUNTER — ANESTHESIA EVENT (OUTPATIENT)
Dept: LABOR AND DELIVERY | Facility: HOSPITAL | Age: 22
End: 2017-12-18

## 2017-12-18 ENCOUNTER — ANESTHESIA (OUTPATIENT)
Dept: LABOR AND DELIVERY | Facility: HOSPITAL | Age: 22
End: 2017-12-18

## 2017-12-18 VITALS — HEART RATE: 106 BPM | SYSTOLIC BLOOD PRESSURE: 109 MMHG | OXYGEN SATURATION: 100 % | DIASTOLIC BLOOD PRESSURE: 57 MMHG

## 2017-12-18 PROBLEM — O26.892 PELVIC PAIN AFFECTING PREGNANCY IN SECOND TRIMESTER, ANTEPARTUM: Status: RESOLVED | Noted: 2017-09-02 | Resolved: 2017-12-18

## 2017-12-18 PROBLEM — O26.813 FATIGUE DURING PREGNANCY IN THIRD TRIMESTER: Status: RESOLVED | Noted: 2017-11-12 | Resolved: 2017-12-18

## 2017-12-18 PROBLEM — N89.8 VAGINAL DISCHARGE DURING PREGNANCY IN THIRD TRIMESTER: Status: RESOLVED | Noted: 2017-09-23 | Resolved: 2017-12-18

## 2017-12-18 PROBLEM — R10.2 PELVIC PAIN AFFECTING PREGNANCY IN SECOND TRIMESTER, ANTEPARTUM: Status: RESOLVED | Noted: 2017-09-02 | Resolved: 2017-12-18

## 2017-12-18 PROBLEM — O26.20 RECURRENT PREGNANCY LOSS WITH CURRENT PREGNANCY: Status: RESOLVED | Noted: 2017-08-24 | Resolved: 2017-12-18

## 2017-12-18 PROBLEM — Z98.891 STATUS POST EMERGENCY CESAREAN SECTION: Status: ACTIVE | Noted: 2017-12-18

## 2017-12-18 PROBLEM — O26.893 VAGINAL DISCHARGE DURING PREGNANCY IN THIRD TRIMESTER: Status: RESOLVED | Noted: 2017-09-23 | Resolved: 2017-12-18

## 2017-12-18 LAB
ABO GROUP BLD: NORMAL
AMPHET+METHAMPHET UR QL: NEGATIVE
AMPHETAMINES UR QL: NEGATIVE
BARBITURATES UR QL SCN: NEGATIVE
BENZODIAZ UR QL SCN: NEGATIVE
BLD GP AB SCN SERPL QL: NEGATIVE
BUPRENORPHINE SERPL-MCNC: NEGATIVE NG/ML
CANNABINOIDS SERPL QL: NEGATIVE
COCAINE UR QL: NEGATIVE
DEPRECATED RDW RBC AUTO: 43 FL (ref 37–54)
DEPRECATED RDW RBC AUTO: 43.3 FL (ref 37–54)
ERYTHROCYTE [DISTWIDTH] IN BLOOD BY AUTOMATED COUNT: 15.7 % (ref 11.3–14.5)
ERYTHROCYTE [DISTWIDTH] IN BLOOD BY AUTOMATED COUNT: 15.8 % (ref 11.3–14.5)
HCT VFR BLD AUTO: 25.9 % (ref 34.5–44)
HCT VFR BLD AUTO: 31.8 % (ref 34.5–44)
HGB BLD-MCNC: 10.1 G/DL (ref 11.5–15.5)
HGB BLD-MCNC: 8 G/DL (ref 11.5–15.5)
MCH RBC QN AUTO: 23.5 PG (ref 27–31)
MCH RBC QN AUTO: 23.8 PG (ref 27–31)
MCHC RBC AUTO-ENTMCNC: 30.9 G/DL (ref 32–36)
MCHC RBC AUTO-ENTMCNC: 31.8 G/DL (ref 32–36)
MCV RBC AUTO: 75 FL (ref 80–99)
MCV RBC AUTO: 76 FL (ref 80–99)
METHADONE UR QL SCN: NEGATIVE
OPIATES UR QL: NEGATIVE
OXYCODONE UR QL SCN: NEGATIVE
PCP UR QL SCN: NEGATIVE
PLATELET # BLD AUTO: 176 10*3/MM3 (ref 150–450)
PLATELET # BLD AUTO: 235 10*3/MM3 (ref 150–450)
PMV BLD AUTO: 12.4 FL (ref 6–12)
PMV BLD AUTO: 12.8 FL (ref 6–12)
PROPOXYPH UR QL: NEGATIVE
RBC # BLD AUTO: 3.41 10*6/MM3 (ref 3.89–5.14)
RBC # BLD AUTO: 4.24 10*6/MM3 (ref 3.89–5.14)
RH BLD: POSITIVE
TRICYCLICS UR QL SCN: NEGATIVE
WBC NRBC COR # BLD: 11.99 10*3/MM3 (ref 3.5–10.8)
WBC NRBC COR # BLD: 14.98 10*3/MM3 (ref 3.5–10.8)

## 2017-12-18 PROCEDURE — 59514 CESAREAN DELIVERY ONLY: CPT | Performed by: OBSTETRICS & GYNECOLOGY

## 2017-12-18 PROCEDURE — 86901 BLOOD TYPING SEROLOGIC RH(D): CPT | Performed by: OBSTETRICS & GYNECOLOGY

## 2017-12-18 PROCEDURE — 25010000002 FENTANYL CITRATE (PF) 100 MCG/2ML SOLUTION: Performed by: ANESTHESIOLOGY

## 2017-12-18 PROCEDURE — 85027 COMPLETE CBC AUTOMATED: CPT | Performed by: OBSTETRICS & GYNECOLOGY

## 2017-12-18 PROCEDURE — 25010000002 BUTORPHANOL PER 1 MG: Performed by: OBSTETRICS & GYNECOLOGY

## 2017-12-18 PROCEDURE — 86850 RBC ANTIBODY SCREEN: CPT | Performed by: OBSTETRICS & GYNECOLOGY

## 2017-12-18 PROCEDURE — 25010000002 ROPIVACAINE PER 1 MG: Performed by: ANESTHESIOLOGY

## 2017-12-18 PROCEDURE — 25010000002 FENTANYL CITRATE (PF) 100 MCG/2ML SOLUTION: Performed by: NURSE ANESTHETIST, CERTIFIED REGISTERED

## 2017-12-18 PROCEDURE — C1755 CATHETER, INTRASPINAL: HCPCS

## 2017-12-18 PROCEDURE — 86900 BLOOD TYPING SEROLOGIC ABO: CPT | Performed by: OBSTETRICS & GYNECOLOGY

## 2017-12-18 PROCEDURE — C1755 CATHETER, INTRASPINAL: HCPCS | Performed by: ANESTHESIOLOGY

## 2017-12-18 PROCEDURE — 59025 FETAL NON-STRESS TEST: CPT

## 2017-12-18 PROCEDURE — 80306 DRUG TEST PRSMV INSTRMNT: CPT | Performed by: OBSTETRICS & GYNECOLOGY

## 2017-12-18 PROCEDURE — 25010000002 PROMETHAZINE PER 50 MG: Performed by: OBSTETRICS & GYNECOLOGY

## 2017-12-18 PROCEDURE — 25010000003 CEFAZOLIN IN DEXTROSE 2-4 GM/100ML-% SOLUTION: Performed by: OBSTETRICS & GYNECOLOGY

## 2017-12-18 PROCEDURE — 51702 INSERT TEMP BLADDER CATH: CPT

## 2017-12-18 PROCEDURE — 25010000002 ONDANSETRON PER 1 MG: Performed by: ANESTHESIOLOGY

## 2017-12-18 PROCEDURE — 25010000002 METHYLERGONOVINE MALEATE PER 0.2 MG: Performed by: OBSTETRICS & GYNECOLOGY

## 2017-12-18 PROCEDURE — 25010000003 MORPHINE PER 10 MG: Performed by: NURSE ANESTHETIST, CERTIFIED REGISTERED

## 2017-12-18 RX ORDER — LIDOCAINE HYDROCHLORIDE 10 MG/ML
5 INJECTION, SOLUTION INFILTRATION; PERINEURAL AS NEEDED
Status: DISCONTINUED | OUTPATIENT
Start: 2017-12-18 | End: 2017-12-18 | Stop reason: HOSPADM

## 2017-12-18 RX ORDER — PROMETHAZINE HYDROCHLORIDE 12.5 MG/1
12.5 TABLET ORAL EVERY 6 HOURS PRN
Status: DISCONTINUED | OUTPATIENT
Start: 2017-12-18 | End: 2017-12-18 | Stop reason: SDUPTHER

## 2017-12-18 RX ORDER — METHYLERGONOVINE MALEATE 0.2 MG/ML
200 INJECTION INTRAVENOUS AS NEEDED
Status: DISCONTINUED | OUTPATIENT
Start: 2017-12-18 | End: 2017-12-18 | Stop reason: HOSPADM

## 2017-12-18 RX ORDER — ONDANSETRON 2 MG/ML
4 INJECTION INTRAMUSCULAR; INTRAVENOUS ONCE AS NEEDED
Status: COMPLETED | OUTPATIENT
Start: 2017-12-18 | End: 2017-12-18

## 2017-12-18 RX ORDER — SODIUM CHLORIDE 0.9 % (FLUSH) 0.9 %
1-10 SYRINGE (ML) INJECTION AS NEEDED
Status: DISCONTINUED | OUTPATIENT
Start: 2017-12-18 | End: 2017-12-18 | Stop reason: HOSPADM

## 2017-12-18 RX ORDER — FAMOTIDINE 10 MG/ML
20 INJECTION, SOLUTION INTRAVENOUS ONCE AS NEEDED
Status: DISCONTINUED | OUTPATIENT
Start: 2017-12-18 | End: 2017-12-18 | Stop reason: HOSPADM

## 2017-12-18 RX ORDER — SIMETHICONE 80 MG
80 TABLET,CHEWABLE ORAL
Status: DISCONTINUED | OUTPATIENT
Start: 2017-12-18 | End: 2017-12-21 | Stop reason: HOSPADM

## 2017-12-18 RX ORDER — PROMETHAZINE HYDROCHLORIDE 25 MG/ML
12.5 INJECTION, SOLUTION INTRAMUSCULAR; INTRAVENOUS EVERY 6 HOURS PRN
Status: DISCONTINUED | OUTPATIENT
Start: 2017-12-18 | End: 2017-12-18 | Stop reason: SDUPTHER

## 2017-12-18 RX ORDER — PROMETHAZINE HYDROCHLORIDE 12.5 MG/1
12.5 SUPPOSITORY RECTAL EVERY 6 HOURS PRN
Status: DISCONTINUED | OUTPATIENT
Start: 2017-12-18 | End: 2017-12-21 | Stop reason: HOSPADM

## 2017-12-18 RX ORDER — ACETAMINOPHEN 325 MG/1
650 TABLET ORAL ONCE AS NEEDED
Status: DISCONTINUED | OUTPATIENT
Start: 2017-12-18 | End: 2017-12-18 | Stop reason: HOSPADM

## 2017-12-18 RX ORDER — LANOLIN 100 %
OINTMENT (GRAM) TOPICAL
Status: DISCONTINUED | OUTPATIENT
Start: 2017-12-18 | End: 2017-12-21 | Stop reason: HOSPADM

## 2017-12-18 RX ORDER — EPHEDRINE SULFATE/0.9% NACL/PF 50 MG/10ML
10 SYRINGE (ML) INTRAVENOUS
Status: DISCONTINUED | OUTPATIENT
Start: 2017-12-18 | End: 2017-12-18 | Stop reason: HOSPADM

## 2017-12-18 RX ORDER — PROMETHAZINE HYDROCHLORIDE 12.5 MG/1
12.5 SUPPOSITORY RECTAL EVERY 6 HOURS PRN
Status: DISCONTINUED | OUTPATIENT
Start: 2017-12-18 | End: 2017-12-18 | Stop reason: SDUPTHER

## 2017-12-18 RX ORDER — OXYTOCIN/RINGER'S LACTATE 20/1000 ML
125 PLASTIC BAG, INJECTION (ML) INTRAVENOUS CONTINUOUS PRN
Status: DISCONTINUED | OUTPATIENT
Start: 2017-12-18 | End: 2017-12-19

## 2017-12-18 RX ORDER — LIDOCAINE HYDROCHLORIDE AND EPINEPHRINE 20; 5 MG/ML; UG/ML
INJECTION, SOLUTION EPIDURAL; INFILTRATION; INTRACAUDAL; PERINEURAL AS NEEDED
Status: DISCONTINUED | OUTPATIENT
Start: 2017-12-18 | End: 2017-12-18 | Stop reason: SURG

## 2017-12-18 RX ORDER — PROMETHAZINE HYDROCHLORIDE 25 MG/ML
12.5 INJECTION, SOLUTION INTRAMUSCULAR; INTRAVENOUS EVERY 6 HOURS PRN
Status: DISCONTINUED | OUTPATIENT
Start: 2017-12-18 | End: 2017-12-18 | Stop reason: HOSPADM

## 2017-12-18 RX ORDER — ZOLPIDEM TARTRATE 5 MG/1
5 TABLET ORAL NIGHTLY PRN
Status: DISCONTINUED | OUTPATIENT
Start: 2017-12-18 | End: 2017-12-18 | Stop reason: SDUPTHER

## 2017-12-18 RX ORDER — OXYCODONE HYDROCHLORIDE AND ACETAMINOPHEN 5; 325 MG/1; MG/1
1 TABLET ORAL EVERY 4 HOURS PRN
Status: DISCONTINUED | OUTPATIENT
Start: 2017-12-18 | End: 2017-12-18 | Stop reason: HOSPADM

## 2017-12-18 RX ORDER — HYDROCODONE BITARTRATE AND ACETAMINOPHEN 5; 325 MG/1; MG/1
1 TABLET ORAL EVERY 4 HOURS PRN
Status: DISCONTINUED | OUTPATIENT
Start: 2017-12-18 | End: 2017-12-21 | Stop reason: HOSPADM

## 2017-12-18 RX ORDER — FENTANYL CITRATE 50 UG/ML
INJECTION, SOLUTION INTRAMUSCULAR; INTRAVENOUS AS NEEDED
Status: DISCONTINUED | OUTPATIENT
Start: 2017-12-18 | End: 2017-12-18 | Stop reason: SURG

## 2017-12-18 RX ORDER — PROMETHAZINE HYDROCHLORIDE 25 MG/1
25 TABLET ORAL EVERY 6 HOURS PRN
Status: DISCONTINUED | OUTPATIENT
Start: 2017-12-18 | End: 2017-12-21 | Stop reason: HOSPADM

## 2017-12-18 RX ORDER — DIPHENHYDRAMINE HCL 25 MG
25 CAPSULE ORAL NIGHTLY PRN
Status: DISCONTINUED | OUTPATIENT
Start: 2017-12-18 | End: 2017-12-18 | Stop reason: SDUPTHER

## 2017-12-18 RX ORDER — OXYTOCIN/RINGER'S LACTATE 20/1000 ML
125 PLASTIC BAG, INJECTION (ML) INTRAVENOUS CONTINUOUS PRN
Status: DISCONTINUED | OUTPATIENT
Start: 2017-12-18 | End: 2017-12-18 | Stop reason: SDUPTHER

## 2017-12-18 RX ORDER — PROMETHAZINE HYDROCHLORIDE 12.5 MG/1
12.5 SUPPOSITORY RECTAL EVERY 6 HOURS PRN
Status: DISCONTINUED | OUTPATIENT
Start: 2017-12-18 | End: 2017-12-18 | Stop reason: HOSPADM

## 2017-12-18 RX ORDER — METOCLOPRAMIDE HYDROCHLORIDE 5 MG/ML
10 INJECTION INTRAMUSCULAR; INTRAVENOUS ONCE AS NEEDED
Status: DISCONTINUED | OUTPATIENT
Start: 2017-12-18 | End: 2017-12-18 | Stop reason: HOSPADM

## 2017-12-18 RX ORDER — OXYTOCIN/RINGER'S LACTATE 20/1000 ML
999 PLASTIC BAG, INJECTION (ML) INTRAVENOUS ONCE
Status: DISCONTINUED | OUTPATIENT
Start: 2017-12-18 | End: 2017-12-18 | Stop reason: SDUPTHER

## 2017-12-18 RX ORDER — OXYTOCIN-SODIUM CHLORIDE 0.9% IV SOLN 30 UNIT/500ML 30-0.9/5 UT/ML-%
2 SOLUTION INTRAVENOUS
Status: DISCONTINUED | OUTPATIENT
Start: 2017-12-18 | End: 2017-12-19

## 2017-12-18 RX ORDER — ONDANSETRON 2 MG/ML
4 INJECTION INTRAMUSCULAR; INTRAVENOUS ONCE
Status: DISCONTINUED | OUTPATIENT
Start: 2017-12-18 | End: 2017-12-18 | Stop reason: HOSPADM

## 2017-12-18 RX ORDER — PROMETHAZINE HYDROCHLORIDE 25 MG/ML
12.5 INJECTION, SOLUTION INTRAMUSCULAR; INTRAVENOUS EVERY 6 HOURS PRN
Status: DISCONTINUED | OUTPATIENT
Start: 2017-12-18 | End: 2017-12-21 | Stop reason: HOSPADM

## 2017-12-18 RX ORDER — MORPHINE SULFATE 2 MG/ML
2 INJECTION, SOLUTION INTRAMUSCULAR; INTRAVENOUS
Status: DISCONTINUED | OUTPATIENT
Start: 2017-12-18 | End: 2017-12-18 | Stop reason: HOSPADM

## 2017-12-18 RX ORDER — PRENATAL VIT/IRON FUM/FOLIC AC 27MG-0.8MG
1 TABLET ORAL DAILY
Status: DISCONTINUED | OUTPATIENT
Start: 2017-12-18 | End: 2017-12-21 | Stop reason: HOSPADM

## 2017-12-18 RX ORDER — DIPHENHYDRAMINE HYDROCHLORIDE 50 MG/ML
25 INJECTION INTRAMUSCULAR; INTRAVENOUS NIGHTLY PRN
Status: DISCONTINUED | OUTPATIENT
Start: 2017-12-18 | End: 2017-12-18 | Stop reason: HOSPADM

## 2017-12-18 RX ORDER — SODIUM CHLORIDE 0.9 % (FLUSH) 0.9 %
1-10 SYRINGE (ML) INJECTION AS NEEDED
Status: DISCONTINUED | OUTPATIENT
Start: 2017-12-18 | End: 2017-12-21 | Stop reason: HOSPADM

## 2017-12-18 RX ORDER — IBUPROFEN 600 MG/1
600 TABLET ORAL EVERY 6 HOURS PRN
Status: DISCONTINUED | OUTPATIENT
Start: 2017-12-18 | End: 2017-12-21 | Stop reason: HOSPADM

## 2017-12-18 RX ORDER — PROMETHAZINE HYDROCHLORIDE 12.5 MG/1
12.5 TABLET ORAL EVERY 6 HOURS PRN
Status: DISCONTINUED | OUTPATIENT
Start: 2017-12-18 | End: 2017-12-18 | Stop reason: HOSPADM

## 2017-12-18 RX ORDER — SODIUM CHLORIDE, SODIUM LACTATE, POTASSIUM CHLORIDE, CALCIUM CHLORIDE 600; 310; 30; 20 MG/100ML; MG/100ML; MG/100ML; MG/100ML
125 INJECTION, SOLUTION INTRAVENOUS CONTINUOUS
Status: DISCONTINUED | OUTPATIENT
Start: 2017-12-18 | End: 2017-12-19

## 2017-12-18 RX ORDER — LIDOCAINE HYDROCHLORIDE AND EPINEPHRINE 15; 5 MG/ML; UG/ML
INJECTION, SOLUTION EPIDURAL AS NEEDED
Status: DISCONTINUED | OUTPATIENT
Start: 2017-12-18 | End: 2017-12-18 | Stop reason: SURG

## 2017-12-18 RX ORDER — CEFAZOLIN SODIUM 2 G/100ML
2 INJECTION, SOLUTION INTRAVENOUS ONCE
Status: COMPLETED | OUTPATIENT
Start: 2017-12-18 | End: 2017-12-18

## 2017-12-18 RX ORDER — CEFAZOLIN SODIUM 2 G/100ML
2 INJECTION, SOLUTION INTRAVENOUS EVERY 8 HOURS
Status: COMPLETED | OUTPATIENT
Start: 2017-12-18 | End: 2017-12-19

## 2017-12-18 RX ORDER — TRISODIUM CITRATE DIHYDRATE AND CITRIC ACID MONOHYDRATE 500; 334 MG/5ML; MG/5ML
30 SOLUTION ORAL ONCE
Status: COMPLETED | OUTPATIENT
Start: 2017-12-18 | End: 2017-12-18

## 2017-12-18 RX ORDER — IBUPROFEN 600 MG/1
600 TABLET ORAL EVERY 6 HOURS PRN
Status: DISCONTINUED | OUTPATIENT
Start: 2017-12-18 | End: 2017-12-18 | Stop reason: HOSPADM

## 2017-12-18 RX ORDER — CARBOPROST TROMETHAMINE 250 UG/ML
250 INJECTION, SOLUTION INTRAMUSCULAR AS NEEDED
Status: DISCONTINUED | OUTPATIENT
Start: 2017-12-18 | End: 2017-12-18 | Stop reason: HOSPADM

## 2017-12-18 RX ORDER — OXYCODONE HYDROCHLORIDE AND ACETAMINOPHEN 5; 325 MG/1; MG/1
1 TABLET ORAL EVERY 4 HOURS PRN
Status: DISCONTINUED | OUTPATIENT
Start: 2017-12-18 | End: 2017-12-21 | Stop reason: HOSPADM

## 2017-12-18 RX ORDER — MISOPROSTOL 200 UG/1
800 TABLET ORAL AS NEEDED
Status: DISCONTINUED | OUTPATIENT
Start: 2017-12-18 | End: 2017-12-18 | Stop reason: HOSPADM

## 2017-12-18 RX ORDER — SODIUM CHLORIDE 0.9 % (FLUSH) 0.9 %
1-10 SYRINGE (ML) INJECTION AS NEEDED
Status: DISCONTINUED | OUTPATIENT
Start: 2017-12-18 | End: 2017-12-18 | Stop reason: SDUPTHER

## 2017-12-18 RX ORDER — FAMOTIDINE 10 MG/ML
20 INJECTION, SOLUTION INTRAVENOUS EVERY 12 HOURS SCHEDULED
Status: DISCONTINUED | OUTPATIENT
Start: 2017-12-18 | End: 2017-12-18 | Stop reason: HOSPADM

## 2017-12-18 RX ORDER — IBUPROFEN 600 MG/1
600 TABLET ORAL ONCE AS NEEDED
Status: DISCONTINUED | OUTPATIENT
Start: 2017-12-18 | End: 2017-12-18 | Stop reason: HOSPADM

## 2017-12-18 RX ORDER — IBUPROFEN 600 MG/1
600 TABLET ORAL EVERY 6 HOURS PRN
Status: DISCONTINUED | OUTPATIENT
Start: 2017-12-18 | End: 2017-12-18 | Stop reason: SDUPTHER

## 2017-12-18 RX ORDER — DIPHENHYDRAMINE HYDROCHLORIDE 50 MG/ML
12.5 INJECTION INTRAMUSCULAR; INTRAVENOUS EVERY 8 HOURS PRN
Status: DISCONTINUED | OUTPATIENT
Start: 2017-12-18 | End: 2017-12-18 | Stop reason: HOSPADM

## 2017-12-18 RX ORDER — DIPHENHYDRAMINE HCL 25 MG
25 CAPSULE ORAL NIGHTLY PRN
Status: DISCONTINUED | OUTPATIENT
Start: 2017-12-18 | End: 2017-12-18 | Stop reason: HOSPADM

## 2017-12-18 RX ORDER — ZOLPIDEM TARTRATE 5 MG/1
5 TABLET ORAL NIGHTLY PRN
Status: DISCONTINUED | OUTPATIENT
Start: 2017-12-18 | End: 2017-12-21 | Stop reason: HOSPADM

## 2017-12-18 RX ORDER — OXYCODONE HYDROCHLORIDE AND ACETAMINOPHEN 5; 325 MG/1; MG/1
1 TABLET ORAL EVERY 4 HOURS PRN
Status: DISCONTINUED | OUTPATIENT
Start: 2017-12-18 | End: 2017-12-18 | Stop reason: SDUPTHER

## 2017-12-18 RX ORDER — SODIUM CHLORIDE, SODIUM LACTATE, POTASSIUM CHLORIDE, CALCIUM CHLORIDE 600; 310; 30; 20 MG/100ML; MG/100ML; MG/100ML; MG/100ML
125 INJECTION, SOLUTION INTRAVENOUS CONTINUOUS
Status: DISCONTINUED | OUTPATIENT
Start: 2017-12-18 | End: 2017-12-18 | Stop reason: SDUPTHER

## 2017-12-18 RX ORDER — OXYCODONE HYDROCHLORIDE AND ACETAMINOPHEN 5; 325 MG/1; MG/1
2 TABLET ORAL EVERY 4 HOURS PRN
Status: DISCONTINUED | OUTPATIENT
Start: 2017-12-18 | End: 2017-12-18 | Stop reason: HOSPADM

## 2017-12-18 RX ORDER — OXYTOCIN 10 [USP'U]/ML
INJECTION, SOLUTION INTRAMUSCULAR; INTRAVENOUS AS NEEDED
Status: DISCONTINUED | OUTPATIENT
Start: 2017-12-18 | End: 2017-12-18 | Stop reason: SURG

## 2017-12-18 RX ORDER — NALOXONE HCL 0.4 MG/ML
0.4 VIAL (ML) INJECTION
Status: ACTIVE | OUTPATIENT
Start: 2017-12-18 | End: 2017-12-19

## 2017-12-18 RX ORDER — FAMOTIDINE 20 MG/1
20 TABLET, FILM COATED ORAL EVERY 12 HOURS SCHEDULED
Status: DISCONTINUED | OUTPATIENT
Start: 2017-12-18 | End: 2017-12-18 | Stop reason: HOSPADM

## 2017-12-18 RX ORDER — ACETAMINOPHEN 325 MG/1
650 TABLET ORAL EVERY 4 HOURS PRN
Status: DISCONTINUED | OUTPATIENT
Start: 2017-12-18 | End: 2017-12-18 | Stop reason: HOSPADM

## 2017-12-18 RX ORDER — ZOLPIDEM TARTRATE 5 MG/1
5 TABLET ORAL NIGHTLY PRN
Status: DISCONTINUED | OUTPATIENT
Start: 2017-12-18 | End: 2017-12-18 | Stop reason: HOSPADM

## 2017-12-18 RX ORDER — OXYTOCIN/RINGER'S LACTATE 20/1000 ML
999 PLASTIC BAG, INJECTION (ML) INTRAVENOUS ONCE
Status: DISCONTINUED | OUTPATIENT
Start: 2017-12-18 | End: 2017-12-19

## 2017-12-18 RX ORDER — DOCUSATE SODIUM 100 MG/1
100 CAPSULE, LIQUID FILLED ORAL 2 TIMES DAILY PRN
Status: DISCONTINUED | OUTPATIENT
Start: 2017-12-18 | End: 2017-12-21 | Stop reason: HOSPADM

## 2017-12-18 RX ORDER — MORPHINE SULFATE 0.5 MG/ML
INJECTION, SOLUTION EPIDURAL; INTRATHECAL; INTRAVENOUS AS NEEDED
Status: DISCONTINUED | OUTPATIENT
Start: 2017-12-18 | End: 2017-12-18 | Stop reason: SURG

## 2017-12-18 RX ORDER — ROPIVACAINE HYDROCHLORIDE 2 MG/ML
14 INJECTION, SOLUTION EPIDURAL; INFILTRATION; PERINEURAL CONTINUOUS
Status: DISCONTINUED | OUTPATIENT
Start: 2017-12-18 | End: 2017-12-19

## 2017-12-18 RX ORDER — DIPHENHYDRAMINE HYDROCHLORIDE 50 MG/ML
25 INJECTION INTRAMUSCULAR; INTRAVENOUS NIGHTLY PRN
Status: DISCONTINUED | OUTPATIENT
Start: 2017-12-18 | End: 2017-12-18 | Stop reason: SDUPTHER

## 2017-12-18 RX ORDER — HYDROMORPHONE HYDROCHLORIDE 1 MG/ML
0.5 INJECTION, SOLUTION INTRAMUSCULAR; INTRAVENOUS; SUBCUTANEOUS
Status: DISCONTINUED | OUTPATIENT
Start: 2017-12-18 | End: 2017-12-18 | Stop reason: HOSPADM

## 2017-12-18 RX ORDER — MORPHINE SULFATE 2 MG/ML
2 INJECTION, SOLUTION INTRAMUSCULAR; INTRAVENOUS
Status: DISCONTINUED | OUTPATIENT
Start: 2017-12-18 | End: 2017-12-18

## 2017-12-18 RX ADMIN — SODIUM CITRATE AND CITRIC ACID MONOHYDRATE 30 ML: 500; 334 SOLUTION ORAL at 11:02

## 2017-12-18 RX ADMIN — FENTANYL CITRATE 100 MCG: 50 INJECTION, SOLUTION INTRAMUSCULAR; INTRAVENOUS at 06:33

## 2017-12-18 RX ADMIN — FENTANYL CITRATE 50 MCG: 50 INJECTION, SOLUTION INTRAMUSCULAR; INTRAVENOUS at 11:42

## 2017-12-18 RX ADMIN — PROMETHAZINE HYDROCHLORIDE 12.5 MG: 25 INJECTION INTRAMUSCULAR; INTRAVENOUS at 00:57

## 2017-12-18 RX ADMIN — FENTANYL CITRATE 50 MCG: 50 INJECTION, SOLUTION INTRAMUSCULAR; INTRAVENOUS at 11:55

## 2017-12-18 RX ADMIN — OXYCODONE AND ACETAMINOPHEN 1 TABLET: 5; 325 TABLET ORAL at 12:34

## 2017-12-18 RX ADMIN — Medication 10 MG: at 07:14

## 2017-12-18 RX ADMIN — IBUPROFEN 600 MG: 600 TABLET ORAL at 12:34

## 2017-12-18 RX ADMIN — SODIUM CHLORIDE, POTASSIUM CHLORIDE, SODIUM LACTATE AND CALCIUM CHLORIDE 125 ML/HR: 600; 310; 30; 20 INJECTION, SOLUTION INTRAVENOUS at 09:27

## 2017-12-18 RX ADMIN — OXYTOCIN 30 UNITS: 10 INJECTION, SOLUTION INTRAMUSCULAR; INTRAVENOUS at 11:59

## 2017-12-18 RX ADMIN — ONDANSETRON 4 MG: 2 INJECTION INTRAMUSCULAR; INTRAVENOUS at 10:59

## 2017-12-18 RX ADMIN — CEFAZOLIN SODIUM 2 G: 2 INJECTION, SOLUTION INTRAVENOUS at 18:38

## 2017-12-18 RX ADMIN — SODIUM CHLORIDE, POTASSIUM CHLORIDE, SODIUM LACTATE AND CALCIUM CHLORIDE: 600; 310; 30; 20 INJECTION, SOLUTION INTRAVENOUS at 11:59

## 2017-12-18 RX ADMIN — Medication 10 MG: at 08:49

## 2017-12-18 RX ADMIN — LIDOCAINE HYDROCHLORIDE AND EPINEPHRINE 3 ML: 15; 5 INJECTION, SOLUTION EPIDURAL at 06:27

## 2017-12-18 RX ADMIN — LIDOCAINE HYDROCHLORIDE,EPINEPHRINE BITARTRATE 10 ML: 20; .005 INJECTION, SOLUTION EPIDURAL; INFILTRATION; INTRACAUDAL; PERINEURAL at 10:52

## 2017-12-18 RX ADMIN — BUTORPHANOL TARTRATE 2 MG: 2 INJECTION, SOLUTION INTRAMUSCULAR; INTRAVENOUS at 03:34

## 2017-12-18 RX ADMIN — OXYCODONE AND ACETAMINOPHEN 1 TABLET: 5; 325 TABLET ORAL at 19:52

## 2017-12-18 RX ADMIN — ROPIVACAINE HYDROCHLORIDE 11 ML: 5 INJECTION, SOLUTION EPIDURAL; INFILTRATION; PERINEURAL at 06:29

## 2017-12-18 RX ADMIN — ROPIVACAINE HYDROCHLORIDE 14 ML/HR: 2 INJECTION, SOLUTION EPIDURAL; INFILTRATION at 06:35

## 2017-12-18 RX ADMIN — IBUPROFEN 600 MG: 600 TABLET ORAL at 19:52

## 2017-12-18 RX ADMIN — OXYTOCIN 2 MILLI-UNITS/MIN: 10 INJECTION INTRAVENOUS at 08:00

## 2017-12-18 RX ADMIN — CEFAZOLIN SODIUM 2 G: 2 INJECTION, SOLUTION INTRAVENOUS at 10:55

## 2017-12-18 RX ADMIN — SIMETHICONE CHEW TAB 80 MG 80 MG: 80 TABLET ORAL at 19:52

## 2017-12-18 RX ADMIN — BUTORPHANOL TARTRATE 2 MG: 2 INJECTION, SOLUTION INTRAMUSCULAR; INTRAVENOUS at 00:57

## 2017-12-18 RX ADMIN — OXYTOCIN 30 UNITS: 10 INJECTION, SOLUTION INTRAMUSCULAR; INTRAVENOUS at 11:31

## 2017-12-18 RX ADMIN — MORPHINE SULFATE 1.5 MCG: 0.5 INJECTION, SOLUTION EPIDURAL; INTRATHECAL; INTRAVENOUS at 11:51

## 2017-12-18 RX ADMIN — METHYLERGONOVINE MALEATE 200 MCG: 0.2 INJECTION INTRAMUSCULAR; INTRAVENOUS at 11:38

## 2017-12-18 RX ADMIN — FAMOTIDINE 20 MG: 10 INJECTION, SOLUTION INTRAVENOUS at 10:59

## 2017-12-18 RX ADMIN — MORPHINE SULFATE 1.5 MCG: 0.5 INJECTION, SOLUTION EPIDURAL; INTRATHECAL; INTRAVENOUS at 11:35

## 2017-12-18 RX ADMIN — SODIUM CHLORIDE, POTASSIUM CHLORIDE, SODIUM LACTATE AND CALCIUM CHLORIDE: 600; 310; 30; 20 INJECTION, SOLUTION INTRAVENOUS at 11:31

## 2017-12-18 NOTE — ANESTHESIA POSTPROCEDURE EVALUATION
Patient: Chasity Chamorro    Procedure Summary     Date Anesthesia Start Anesthesia Stop Room / Location    17 0618 1203 [facility] / [facility]       Procedure Diagnosis Surgeon Provider     SECTION PRIMARY (N/A Abdomen) No diagnosis on file. MD Mikal Land MD          Anesthesia Type: epidural  Last vitals  BP   113/60 (17 1204)   Temp   98.3 °F (36.8 °C) (17 1204)   Pulse   103 (17 1204)   Resp   16 (17 1204)     SpO2   99 % (17 1204)     Post Anesthesia Care and Evaluation    Patient location during evaluation: bedside  Patient participation: complete - patient participated  Level of consciousness: awake and alert  Pain management: adequate  Airway patency: patent  Anesthetic complications: No anesthetic complications    Cardiovascular status: acceptable  Respiratory status: acceptable  Hydration status: acceptable

## 2017-12-18 NOTE — OP NOTE
Operative Note    Patient name: Chasity Chamorro  YOB: 1995   MRN: [id number]  Admission Date: 2017  Referring Provider: Hakeem Retana MD    ID: 22 y.o.  at 38w4d    Preoperative Diagnosis:   Patient Active Problem List   Diagnosis   • Pregnancy   • Status post emergency  section       Postoperative Diagnosis: Same as above.    Procedure(s): primarylow transverse  delivery     Surgeons: Surgeon(s) and Role:     * Hakeem Retana MD - Primary    Anesthesia: Epidural    Estimated Blood Loss: 800 mL mL    IV Fluids: [unfilled]    Preoperative antibiotic: Ancef (cefazolin) 2 grams    Blood products:   Blood Administration Record     None          Pathology:   Order Name Source Comment Collection Info Order Time   URINE DRUG SCREEN Urine, Clean Catch  Collected By: Tsering Lin RN 2017 11:00 AM       Drains: Powell catheter to gravity    Complications: None    Condition: Stable to recovery room                                          Infant:                 Gender: male  infant    Weight: 3005 g (6 lb 10 oz)     Apgars: 8   @ 1 minute /     9   @ 5 minutes    Cord gases: Venous:  @BABYNOHDR(BRIEFLAB, PHCVEN, BECVEN)@     Arterial:  @BABYNOHDR(BRIEFLAB, PHCART, BECART)@         Operative Summary:   After obtaining informed consent the patient was taken to the operating room where adequate anesthesia was obtained.  Powell catheter was placed in the bladder preoperatively.  IV antibiotics were given preoperatively.       The abdomen was prepped and draped in the usual sterile fashion for  delivery.  After confirming adequate anesthesia a Pfannenstiel skin incision was made with the scalpel and carried through to the underlying layer of fascia.  The fascia was incised in the midline and the incision extended laterally with the Simmons scissors and with blunt dissection.       The upper aspect of the fascia was grasped with 2 Kocher clamps, elevated, and dissected off  the underlying rectus muscles bluntly and with the Simmons scissors.  The Kocher clamps were removed and applied to the inferior aspect of the fascia.  The fascia was dissected off of the rectus muscles in the same fashion.  The peritoneum was entered bluntly.  The incision was stretched and the bladder blade and Lawrence retractor inserted for visualization of the uterus.       The uterus was incised with the scalpel in a low transverse fashion.  The uterine incision was entered digitally and the incision extended bluntly in a cranial-caudal fashion.  Retractors were removed and membranes were ruptured.  The infant was delivered atraumatically from vertex presentation.  The umbilical cord was clamped and cut and the nose and mouth bulb suctioned.  The infant was handed off to waiting pediatric staff.       Cord blood gases were not collected.  Cord blood was collected.  The placenta was removed using cord traction and uterine massage.  The uterus was exteriorized and cleared of all clots and debris.  The uterine incision was repaired with #1 Chromic gut in a running locked fashion. A double-layer technique was used.  Additional hemostatic measures required: figure-of-eight sutures.    The incision was inspected and excellent hemostasis was noted.  The tubes and ovaries were noted to be normal.  The appendix was not visualized..  The uterus was returned to the abdomen.  The gutters were cleared of all clots and debris.  Irrigation was used.  The uterine incision was again inspected and found to be hemostatic.       The peritoneum was reapproximated with 2-0 Vicryl.  The fascia was closed with 0 Vicryl in a running fashion.  The subcutaneous space was reapproximated using 3-0 Plain gut.      The skin was closed using  Staples.  The patient was transferred to the recovery room in stable condition.    Hakeem Retana MD  12/18/2017

## 2017-12-18 NOTE — ANESTHESIA PREPROCEDURE EVALUATION
Anesthesia Evaluation     Patient summary reviewed and Nursing notes reviewed   NPO Solid Status: > 8 hours  NPO Liquid Status: > 8 hours     Airway   Mallampati: II  Neck ROM: full  no difficulty expected  Dental      Pulmonary    (+) asthma (seasonal),   Cardiovascular - negative cardio ROS        Neuro/Psych  (+) psychiatric history Anxiety,    GI/Hepatic/Renal/Endo - negative ROS     Musculoskeletal (-) negative ROS    Abdominal    Substance History - negative use     OB/GYN    (+) Pregnant,         Other - negative ROS                                       Anesthesia Plan    ASA 2     epidural     Anesthetic plan and risks discussed with patient and spouse/significant other.

## 2017-12-18 NOTE — PLAN OF CARE
Problem: Patient Care Overview (Adult)  Goal: Plan of Care Review  Outcome: Ongoing (interventions implemented as appropriate)    Problem: Breastfeeding (Adult,NICU,[place],Obstetrics,Pediatric)  Goal: Signs and Symptoms of Listed Potential Problems Will be Absent or Manageable (Breastfeeding)  Outcome: Ongoing (interventions implemented as appropriate)

## 2017-12-18 NOTE — ANESTHESIA PROCEDURE NOTES
Labor Epidural    Patient location during procedure: OB  Performed By  Anesthesiologist: VIVIANA OSULLIVAN  Preanesthetic Checklist  Completed: patient identified, surgical consent, pre-op evaluation, timeout performed, IV checked, risks and benefits discussed and monitors and equipment checked  Prep:  Pt Position:sitting  Sterile Tech:cap, gloves, mask and sterile barrier  Prep:DuraPrep  Monitoring:blood pressure monitoring  Epidural Block Procedure:  Approach:midline  Guidance:palpation technique  Location:L3-L4  Needle Type:Tuohy  Needle Gauge:17 G  Loss of Resistance Medium: air  Loss of Resistance: 4cm  Cath Depth at skin:12 cm  Paresthesia: none  Aspiration:negative  Test Dose:negative  Number of Attempts: 1  Post Assessment:  Dressing:occlusive dressing applied and secured with tape  Pt Tolerance:patient tolerated the procedure well with no apparent complications  Complications:no

## 2017-12-18 NOTE — PROGRESS NOTES
"[facility]  Obstetric Progress Note    Subjective     Patient:    The patient feels comfortable.      Objective     Vital Signs Range for the last 24 hours  Temp:  [98.1 °F (36.7 °C)] 98.1 °F (36.7 °C)   Temp src: Oral   BP: ()/(49-74) 119/74   Heart Rate:  [] 98   Resp:  [18] 18           O2 Device: room air   Weight:  [67.6 kg (149 lb)] 67.6 kg (149 lb)       Flowsheet Rows         First Filed Value    Admission Height  160 cm (63\") Documented at 12/17/2017 2221    Admission Weight  67.6 kg (149 lb) Documented at 12/17/2017 2221          Intake/Output last 24 hours:    No intake or output data in the 24 hours ending 12/18/17 0745    Intake/Output this shift:         Physical Exam:  General: Patient is comfortable and in no acute distress   Heart CVS exam: normal rate, regular rhythm, normal S1, S2, no murmurs, rubs, clicks or gallops.   Lungs Chest: clear to auscultation, no wheezes, rales or rhonchi, symmetric air entry.     Abdomen Abdominal exam: not examined.   Extremities Exam of extremities: no pedal edema noted     Presentation: Vertex, AROM with clear fluid, IUPC placed    Cervix: Exam by: Method: sterile exam per physician   Dilation: Dilation: 3.5   Effacement: Cervical Effacement: %   Station: Station: -1         Fetal Heart Rate Assessment   Method: Fetal HR Assessment Method: external   Beats/min: Fetal HR (Beats/Min): 130   Baseline: Fetal HR Baseline: normal range (110-160 bpm)   Varibility: Fetal HR Variability: moderate (amplitude range 6 to 25 bpm)   Accels: Fetal HR Accelerations: greater than/equal to 15 bpm, lasting at least 15 seconds   Decels: Fetal HR Decelerations: absent   Tracing Category:       Uterine Assessment   Method: Method: TOCO (external toco transducer)   Frequency (min): Contraction Frequency (min): toco with poor tracing   Ctx Count in 10 min:     Duration: Contraction Duration (sec): 60-80   Intensity: Contraction Intensity: moderate by palpation "   Intensity by IUPC:     Resting Tone: Uterine Resting Tone: soft by palpation   Resting Tone by IUPC:     Chris Units:         Assessment/Plan     Active Problems:    Labor without complication        Assessment:  1.  Intrauterine pregnancy at 38w4d weeks gestation with reactive fetal status.    2.  labor  with ROM  3.  Obstetrical history significant for is non-contributory.  4.  GBS status: No results found for: GBSANTIGEN    Plan:  1. fetal and uterine monitoring  continuously, labor augmentation  Pitocin and analgesia with  epidural  2. Plan of care has been reviewed with patient and patient agrees  3.  Risks, benefits of treatment plan have been discussed.  4.  All questions have been answered.  5.  Will start pitocin per protocol after epidural      Hakeem Retana MD  12/18/2017  7:45 AM

## 2017-12-18 NOTE — PLAN OF CARE
Problem: Patient Care Overview (Adult)  Goal: Plan of Care Review  Outcome: Outcome(s) achieved Date Met:  12/18/17 12/18/17 0655   Coping/Psychosocial Response Interventions   Plan Of Care Reviewed With patient   Patient Care Overview   Progress progress towards functional goals is fair       Goal: Discharge Needs Assessment  Outcome: Ongoing (interventions implemented as appropriate)    12/18/17 0655   Discharge Needs Assessment   Concerns To Be Addressed denies needs/concerns at this time   Readmission Within The Last 30 Days current reason for admission unrelated to previous admission   Equipment Needed After Discharge none   Discharge Disposition still a patient   Living Environment   Transportation Available car;family or friend will provide         Problem: Labor (Cervical Ripen, Induct, Augment) (Adult,Obstetrics,Pediatric)  Goal: Signs and Symptoms of Listed Potential Problems Will be Absent or Manageable (Labor)  Outcome: Ongoing (interventions implemented as appropriate)    12/18/17 0655   Labor (Cervical Ripen, Induct, Augment)   Problems Assessed (Labor) all   Problems Present (Labor) pain

## 2017-12-18 NOTE — PROGRESS NOTES
The baby continues to have repeat late decelerations despite all conservative management solutions the cervix is still 3 to 4 cm. We will proceed with a primary C section for fetal intolerance to labor.    Hakeem Retana MD

## 2017-12-18 NOTE — LACTATION NOTE
This note was copied from a baby's chart.     12/18/17 7932   Maternal Information   Date of Referral 12/18/17   Person Making Referral other (see comments)  (courtesy)   Maternal Reason for Referral breastfeeding currently   Maternal Infant Assessment   Size Issue, Bilateral Breasts no   Shape, Bilateral Breasts round   Density, Bilateral Breasts soft   Nipple, Left everted   Nipple Condition, Left intact   Infant Assessment   Sucking Reflex present   Rooting Reflex present   Swallow Reflex present   LATCH Score   Latch 2-->grasps breast, tongue down, lips flanged, rhythmic sucking   Audible Swallowing 1-->a few with stimulation   Type Of Nipple 2-->everted (after stimulation)   Comfort (Breast/Nipple) 2-->soft/nontender   Hold (Positioning) 2-->no assist from staff, mother able to position/hold infant   Score (less than 7 for 2/more consecutive times, consult Lactation Consultant) 9   Maternal Infant Feeding   Maternal Emotional State relaxed   Previous Breastfeeding History no   Infant Positioning cradle   Latch Assistance no   Feeding Infant   Effective Latch During Feeding yes   Skin-to-Skin Contact During Feeding no   Equipment Type/Education   Breast Pump Type double electric, personal

## 2017-12-18 NOTE — L&D DELIVERY NOTE
[facility]   Section Operative Note    Pre-Operative Dx:   1.  IUP at 38w4d    2.  Fetal intolerance to labor    Fetal Intolerance of Labor    Postoperative dx:    1.  Same     Procedure: Procedure(s):   SECTION PRIMARY   Surgeon: Hakeem Retana     Assistant:    Anesthesia: Epidural    EBL:    mls.  800 mL mls.         IV Fluids: 1500 mls.   UOP: 200 mls.    I/O this shift:  In: 1850 [I.V.:1500]  Out: 1000 [Urine:200; Blood:800]   Antibiotics: cefazolin (Ancef)     Infant:      Name:            Gender: male  infant    Weight: 3005 g (6 lb 10 oz)     Apgars: 8   @ 1 minute /     9   @ 5 minutes    Cord gases: Venous:  @BABYNOHDR(BRIEFLAB, PHCVEN, BECVEN)@     Arterial:  @BABYNOHDR(BRIEFLAB, PHCART, BECART)@     Indication for C/Section:  Fetal Intolerance of Labor          Priority for C/Section:  Emergency      Procedure Details:   Patient experienced a primary  section under epidural anesthesia for repeated late decelerations in labor.  She delivered a viable fetus through a low transverse uterine incision.        Complications:   None      Disposition:   Mother to Mother Baby/Postpartum  in stable condition currently.   Baby to NBN  in stable condition currently.       Hakeem Retana MD  2017  12:29 PM

## 2017-12-18 NOTE — PLAN OF CARE
Problem:  Delivery (Adult,Obstetrics,Pediatric)  Goal: Signs and Symptoms of Listed Potential Problems Will be Absent or Manageable ( Delivery)  Outcome: Outcome(s) achieved Date Met:  17 1436    Delivery   Problems Assessed ( Delivery) all   Problems Present ( Delivery) none

## 2017-12-18 NOTE — H&P
[facility]and[facility]  Obstetric History and Physical    Chief Complaint   Patient presents with   • Contractions       Subjective     Patient is a 22 y.o. female  currently at 38w4d, who presents with painful contractions every 2-4 minutes.   She mainly feels more pain in her lower back.  She denies leaking.  +FM  She now has some bloody show.  GBS negative.   +BV and being treated              Prenatal Information:  Prenatal Results         Initial Prenatal Labs Ref. Range Date Time   Hemoglobin  11.7 g/dL 11.5 - 15.5 g/dL 17   Hematocrit  36.7 % 34.5 - 44.0 % 17   Platelets  193 10*3/mm3 150 - 450 10*3/mm3 17 230   Rubella IgG  28.7 IU/mL IU/mL 17 172      Immune  Immune 17   Hepatitis B SAg  Non-Reactive  Non-Reactive 17   Hepatitis C Ab       RPR  Non-Reactive  Non-Reactive 17   ABO  A   17   Rh  Positive   17   Antibody Screen  Negative   17   HIV  Non-Reactive  Non-Reactive 17   Urine Culture  40,000-50,000 CFU/mL Normal Urogenital Ligia   10/26/17 0025   Gonorrhea       Chlamydia       TSH  0.089 mIU/mL (L) 0.350 - 5.350 mIU/mL 17   2nd and 3rd Trimester Ref. Range Date Time   Hemoglobin (repeated)  9.2 g/dL (L) 11.5 - 15.5 g/dL 17   Hematocrit (repeated)  27.9 % (L) 34.5 - 44.0 % 17   GCT  92 mg/dL 65 - 199 mg/dL 17 1505   Antibody Screen (repeated)       GTT Fasting       GTT 1 Hr       GTT 2 Hr       GTT 3 Hr       Group B Strep       Drug Screening Ref. Range Date Time   Amphetamine Screen       Barbiturate Screen       Benzodiazepine Screen       Methadone Screen       Phencyclidine Screen       Opiates Screen       THC Screen       Cocaine Screen       Propoxyphene Screen       Buprenorphine Screen       Methamphetamine Screen       Oxycodone Screen       Tryicyclic Antidepressants Screen       Other (Risk screening) Ref. Range Date Time   Varicella IgG   >4000 index Immune >165 index 17 1727   Parvovirus IgG       CMV IgG       Cystic Fibrosis       Hemoglobin electrophoresis       NIPT       MSAFP-4       AFP (for NTD only)              Legend: ^: Historical            View all results for this pregnancy             Past OB History:     Obstetric History       T0      L0     SAB0   TAB0   Ectopic0   Multiple0   Live Births0       # Outcome Date GA Lbr Varinder/2nd Weight Sex Delivery Anes PTL Lv   3 Current            2 SAB 2017 5w0d    SAB      1 SAB 2013 5w0d    SAB             Past Medical History: Past Medical History:   Diagnosis Date   • Abnormal Pap smear of cervix    • Anxiety    • Bacterial vaginosis 2017   • Chlamydia     treated   • Depression    • Genital herpes    • Scoliosis     Diagnosed at age 12   • Urogenital trichomoniasis     treated      Past Surgical History Past Surgical History:   Procedure Laterality Date   • TONSILLECTOMY     • WISDOM TOOTH EXTRACTION        Family History: Family History   Problem Relation Age of Onset   • Bipolar disorder Mother    • No Known Problems Father    • Diabetes Maternal Grandfather    • Diabetes Maternal Aunt    • Breast cancer Neg Hx    • Ovarian cancer Neg Hx    • Colon cancer Neg Hx       Social History:  reports that she quit smoking about 6 months ago. She has never used smokeless tobacco.   reports that she does not drink alcohol.   reports that she does not use illicit drugs.        Review of Systems:  Reviewed in EPIC      Objective     Vital Signs Range for the last 24 hours  Temperature: Temp:  [98.1 °F (36.7 °C)] 98.1 °F (36.7 °C)   Temp Source: Temp src: Oral   BP: BP: (109)/(66) 109/66   Pulse: Heart Rate:  [97] 97   Respirations: Resp:  [18] 18   SPO2:     O2 Amount (l/min):     O2 Devices O2 Device: room air   Weight: Weight:  [67.6 kg (149 lb)] 67.6 kg (149 lb)     Physical Examination: General appearance - oriented to person, place, and time  Chest - no  tachypnea, retractions or cyanosis  Heart - normal rate and regular rhythm, S1 and S2 normal  Abdomen - soft, nontender, nondistended, no masses or organomegaly  no rebound tenderness noted  bowel sounds normal  Extremities - no pedal edema noted    Presentation: cephalic   Cervix: Exam by: Method: sterile exam per physician   Dilation: Dilation: 2   Effacement: Cervical Effacement: 60-70%   Station: Station: -1     Fetal Heart Rate Assessment   Method: Fetal HR Assessment Method: external   Beats/min: Fetal HR (Beats/Min): 140   Baseline: Fetal HR Baseline: normal range (110-160 bpm)   Varibility: Fetal HR Variability: moderate (amplitude range 6 to 25 bpm)   Accels: Fetal HR Accelerations: greater than/equal to 15 bpm, lasting at least 15 seconds   Decels: Fetal HR Decelerations: absent   Tracing Category:       Uterine Assessment   Method: Method: TOCO (external toco transducer), palpation   Frequency (min): Contraction Frequency (min): 2-5   Ctx Count in 10 min:     Duration: Contraction Duration (sec):    Intensity: Contraction Intensity: moderate by palpation   Intensity by IUPC:     Resting Tone: Uterine Resting Tone: soft by palpation   Resting Tone by IUPC:         Assessment/Plan         Assessment & Plan    Assessment:  1.  Intrauterine pregnancy at 38w4d weeks gestation with reassuring fetal status.    2.  Labour    Plan:  1.  Admit  2.  IV, CBC, T&S  3.  Monitor per protocol      Giuseppe Fernández MD  12/18/2017  12:34 AM

## 2017-12-19 LAB
BASOPHILS # BLD AUTO: 0.01 10*3/MM3 (ref 0–0.2)
BASOPHILS NFR BLD AUTO: 0.1 % (ref 0–1)
DEPRECATED RDW RBC AUTO: 43.1 FL (ref 37–54)
EOSINOPHIL # BLD AUTO: 0.13 10*3/MM3 (ref 0–0.3)
EOSINOPHIL NFR BLD AUTO: 1 % (ref 0–3)
ERYTHROCYTE [DISTWIDTH] IN BLOOD BY AUTOMATED COUNT: 15.8 % (ref 11.3–14.5)
HCT VFR BLD AUTO: 21.7 % (ref 34.5–44)
HGB BLD-MCNC: 6.9 G/DL (ref 11.5–15.5)
IMM GRANULOCYTES # BLD: 0.05 10*3/MM3 (ref 0–0.03)
IMM GRANULOCYTES NFR BLD: 0.4 % (ref 0–0.6)
LYMPHOCYTES # BLD AUTO: 2.29 10*3/MM3 (ref 0.6–4.8)
LYMPHOCYTES NFR BLD AUTO: 17.5 % (ref 24–44)
MCH RBC QN AUTO: 24 PG (ref 27–31)
MCHC RBC AUTO-ENTMCNC: 31.8 G/DL (ref 32–36)
MCV RBC AUTO: 75.6 FL (ref 80–99)
MONOCYTES # BLD AUTO: 1.1 10*3/MM3 (ref 0–1)
MONOCYTES NFR BLD AUTO: 8.4 % (ref 0–12)
NEUTROPHILS # BLD AUTO: 9.48 10*3/MM3 (ref 1.5–8.3)
NEUTROPHILS NFR BLD AUTO: 72.6 % (ref 41–71)
PLATELET # BLD AUTO: 188 10*3/MM3 (ref 150–450)
PMV BLD AUTO: 12.3 FL (ref 6–12)
RBC # BLD AUTO: 2.87 10*6/MM3 (ref 3.89–5.14)
WBC NRBC COR # BLD: 13.06 10*3/MM3 (ref 3.5–10.8)

## 2017-12-19 PROCEDURE — 99231 SBSQ HOSP IP/OBS SF/LOW 25: CPT | Performed by: OBSTETRICS & GYNECOLOGY

## 2017-12-19 PROCEDURE — 85025 COMPLETE CBC W/AUTO DIFF WBC: CPT | Performed by: OBSTETRICS & GYNECOLOGY

## 2017-12-19 PROCEDURE — 25010000003 CEFAZOLIN IN DEXTROSE 2-4 GM/100ML-% SOLUTION: Performed by: OBSTETRICS & GYNECOLOGY

## 2017-12-19 RX ORDER — OXYCODONE HYDROCHLORIDE AND ACETAMINOPHEN 5; 325 MG/1; MG/1
2 TABLET ORAL EVERY 4 HOURS PRN
Status: DISCONTINUED | OUTPATIENT
Start: 2017-12-19 | End: 2017-12-21 | Stop reason: HOSPADM

## 2017-12-19 RX ADMIN — IBUPROFEN 600 MG: 600 TABLET ORAL at 06:11

## 2017-12-19 RX ADMIN — OXYCODONE AND ACETAMINOPHEN 1 TABLET: 5; 325 TABLET ORAL at 12:06

## 2017-12-19 RX ADMIN — IBUPROFEN 600 MG: 600 TABLET ORAL at 18:16

## 2017-12-19 RX ADMIN — SIMETHICONE CHEW TAB 80 MG 80 MG: 80 TABLET ORAL at 12:05

## 2017-12-19 RX ADMIN — PRENATAL VIT W/ FE FUMARATE-FA TAB 27-0.8 MG 1 TABLET: 27-0.8 TAB at 12:05

## 2017-12-19 RX ADMIN — OXYCODONE AND ACETAMINOPHEN 2 TABLET: 5; 325 TABLET ORAL at 21:13

## 2017-12-19 RX ADMIN — IBUPROFEN 600 MG: 600 TABLET ORAL at 12:10

## 2017-12-19 RX ADMIN — OXYCODONE AND ACETAMINOPHEN 1 TABLET: 5; 325 TABLET ORAL at 06:11

## 2017-12-19 RX ADMIN — DOCUSATE SODIUM 100 MG: 100 CAPSULE, LIQUID FILLED ORAL at 12:05

## 2017-12-19 RX ADMIN — OXYCODONE AND ACETAMINOPHEN 1 TABLET: 5; 325 TABLET ORAL at 01:58

## 2017-12-19 RX ADMIN — DOCUSATE SODIUM 100 MG: 100 CAPSULE, LIQUID FILLED ORAL at 18:16

## 2017-12-19 RX ADMIN — SIMETHICONE CHEW TAB 80 MG 80 MG: 80 TABLET ORAL at 18:16

## 2017-12-19 RX ADMIN — OXYCODONE AND ACETAMINOPHEN 1 TABLET: 5; 325 TABLET ORAL at 18:16

## 2017-12-19 RX ADMIN — Medication 10 ML: at 19:09

## 2017-12-19 RX ADMIN — CEFAZOLIN SODIUM 2 G: 2 INJECTION, SOLUTION INTRAVENOUS at 02:01

## 2017-12-19 NOTE — PROGRESS NOTES
[facility] [place]    2017    Name:Chasity Chamorro    MR#:[id number]     PROGRESS NOTE:  Post-Op 1 S/P    HD:1    Subjective   22 y.o. yo Female  s/p CS at 38w4d doing well. Pain fairly well controlled. Tolerating regular diet and having flatus. Lochia normal.     Patient Active Problem List   Diagnosis   • Pregnancy   • Status post emergency  section        Objective    Vitals  Temp:  Temp:  [97.9 °F (36.6 °C)-99.1 °F (37.3 °C)] 98.9 °F (37.2 °C)  Temp src: Oral  BP:  BP: ()/(47-79) 89/47  Pulse:  Heart Rate:  [] 85  RR:   Resp:  [14-18] 14    General Awake, alert, no distress  Abdomen Soft, non-distended, fundus firm, -2 below umbilicus, appropriately tender  Incision  Intact, no erythema or exudate  Extremities Calves NT bilaterally     I/O last 3 completed shifts:  In: 3400 [I.V.:1500]  Out: 2800 [Urine:2000; Blood:800]    LABS:   Lab Results   Component Value Date    WBC 13.06 (H) 2017    HGB 6.9 (L) 2017    HCT 21.7 (L) 2017    MCV 75.6 (L) 2017     2017     The patient's hematocrit has decreased from 31 to 21, will repeat in a.m.    Infant: male       Assessment   1.  POD 1    Plan: Doing well.  Routine postoperative care, repeat CBC in a.m., increased pain medication if needed.      Active Problems:      Postoperative anemia      Hakeem Retana MD  2017 9:02 AM

## 2017-12-19 NOTE — LACTATION NOTE
This note was copied from a baby's chart.  Medela pump demonstrated for mom.  Pumping/storage teaching done.  Mom reports baby is still latching and nursing well.  Nipples intact.  Easy hand expression.

## 2017-12-19 NOTE — PLAN OF CARE
Problem: Breastfeeding (Adult,NICU,[place],Obstetrics,Pediatric)  Goal: Signs and Symptoms of Listed Potential Problems Will be Absent or Manageable (Breastfeeding)  Outcome: Ongoing (interventions implemented as appropriate)

## 2017-12-19 NOTE — CONSULTS
Continued Stay Note  [facility]     Patient Name: Chasity Chamorro  MRN: [id number]  Today's Date: 12/19/2017    Admit Date: 12/17/2017          Discharge Plan       12/19/17 1552    Case Management/Social Work Plan    Plan MSW available.    Additional Comments Spoke with pt. Discussed PPD. Pt has h/o depression and has service dog for emotional well being. States she does not want to use meds if possibe. Lives alone but plans to stay with [facility] for a coup,e of weeks. Reports FOB as supportive. Discussed Hands program- mother declined.               Discharge Codes     None            Joellen Dean MSW

## 2017-12-20 LAB
DEPRECATED RDW RBC AUTO: 43.5 FL (ref 37–54)
ERYTHROCYTE [DISTWIDTH] IN BLOOD BY AUTOMATED COUNT: 15.8 % (ref 11.3–14.5)
HCT VFR BLD AUTO: 21.1 % (ref 34.5–44)
HGB BLD-MCNC: 6.8 G/DL (ref 11.5–15.5)
MCH RBC QN AUTO: 24.2 PG (ref 27–31)
MCHC RBC AUTO-ENTMCNC: 32.2 G/DL (ref 32–36)
MCV RBC AUTO: 75.1 FL (ref 80–99)
PLATELET # BLD AUTO: 217 10*3/MM3 (ref 150–450)
PMV BLD AUTO: 11.7 FL (ref 6–12)
RBC # BLD AUTO: 2.81 10*6/MM3 (ref 3.89–5.14)
WBC NRBC COR # BLD: 14.68 10*3/MM3 (ref 3.5–10.8)

## 2017-12-20 PROCEDURE — 85027 COMPLETE CBC AUTOMATED: CPT | Performed by: OBSTETRICS & GYNECOLOGY

## 2017-12-20 PROCEDURE — 99231 SBSQ HOSP IP/OBS SF/LOW 25: CPT | Performed by: OBSTETRICS & GYNECOLOGY

## 2017-12-20 RX ADMIN — DOCUSATE SODIUM 100 MG: 100 CAPSULE, LIQUID FILLED ORAL at 17:34

## 2017-12-20 RX ADMIN — SIMETHICONE CHEW TAB 80 MG 80 MG: 80 TABLET ORAL at 09:45

## 2017-12-20 RX ADMIN — IBUPROFEN 600 MG: 600 TABLET ORAL at 09:45

## 2017-12-20 RX ADMIN — IBUPROFEN 600 MG: 600 TABLET ORAL at 17:34

## 2017-12-20 RX ADMIN — OXYCODONE AND ACETAMINOPHEN 2 TABLET: 5; 325 TABLET ORAL at 04:33

## 2017-12-20 RX ADMIN — PRENATAL VIT W/ FE FUMARATE-FA TAB 27-0.8 MG 1 TABLET: 27-0.8 TAB at 09:45

## 2017-12-20 RX ADMIN — IBUPROFEN 600 MG: 600 TABLET ORAL at 23:45

## 2017-12-20 RX ADMIN — OXYCODONE AND ACETAMINOPHEN 2 TABLET: 5; 325 TABLET ORAL at 17:34

## 2017-12-20 RX ADMIN — OXYCODONE AND ACETAMINOPHEN 2 TABLET: 5; 325 TABLET ORAL at 23:45

## 2017-12-20 RX ADMIN — OXYCODONE AND ACETAMINOPHEN 2 TABLET: 5; 325 TABLET ORAL at 09:45

## 2017-12-20 NOTE — PLAN OF CARE
Problem: Patient Care Overview (Adult)  Goal: Plan of Care Review  Outcome: Ongoing (interventions implemented as appropriate)   17 0915   Coping/Psychosocial Response Interventions   Plan Of Care Reviewed With patient   Patient Care Overview   Progress improving       Problem: Breastfeeding (Adult,NICU,[place],Obstetrics,Pediatric)  Goal: Signs and Symptoms of Listed Potential Problems Will be Absent or Manageable (Breastfeeding)  Outcome: Ongoing (interventions implemented as appropriate)   17 0915   Breastfeeding   Problems Assessed (Breastfeeding) all   Problems Present (Breastfeeding) none       Problem: Postpartum ( Delivery) (Adult)  Goal: Signs and Symptoms of Listed Potential Problems Will be Absent or Manageable (Postpartum)  Outcome: Ongoing (interventions implemented as appropriate)   17 0915   Postpartum ( Delivery)   Problems Assessed (Postpartum ) all   Problems Present (Postpartum ) pain

## 2017-12-20 NOTE — PROGRESS NOTES
[facility] [place]    2017    Name:Chasity Chamorro    MR#:[id number]     PROGRESS NOTE:  Post-Op 2 S/P    HD:2    Subjective   22 y.o. yo Female  s/p CS at 38w4d doing well. Pain well controlled. Tolerating regular diet and having flatus. Lochia normal.     Patient Active Problem List   Diagnosis   • Pregnancy   • Status post emergency  section        Objective    Vitals  Temp:  Temp:  [98 °F (36.7 °C)-98.9 °F (37.2 °C)] 98.9 °F (37.2 °C)  Temp src: Oral  BP:  BP: (84-99)/(42-53) 94/52  Pulse:  Heart Rate:  [86-98] 89  RR:   Resp:  [16-18] 16    General Awake, alert, no distress  Abdomen Soft, non-distended, fundus firm, -2 below umbilicus, appropriately tender  Incision  Intact, no erythema or exudate  Extremities Calves NT bilaterally     I/O last 3 completed shifts:  In: -   Out: 4350 [Urine:4350]    LABS:   Lab Results   Component Value Date    WBC 14.68 (H) 2017    HGB 6.8 (L) 2017    HCT 21.1 (L) 2017    MCV 75.1 (L) 2017     2017     Hct is stable at 21    Infant: male       Assessment   1.  POD 2    Plan: Doing well.  Routine postoperative care, patient is doing well, home tomorrow      Active Problems:   None      Hakeem Retana MD  2017 12:18 PM

## 2017-12-21 VITALS
BODY MASS INDEX: 26.4 KG/M2 | TEMPERATURE: 98 F | HEIGHT: 63 IN | DIASTOLIC BLOOD PRESSURE: 56 MMHG | HEART RATE: 65 BPM | WEIGHT: 149 LBS | OXYGEN SATURATION: 100 % | SYSTOLIC BLOOD PRESSURE: 110 MMHG | RESPIRATION RATE: 16 BRPM

## 2017-12-21 PROCEDURE — 99238 HOSP IP/OBS DSCHRG MGMT 30/<: CPT | Performed by: OBSTETRICS & GYNECOLOGY

## 2017-12-21 RX ORDER — PRENATAL VIT/IRON FUM/FOLIC AC 27MG-0.8MG
1 TABLET ORAL DAILY
Qty: 30 TABLET | Refills: 5 | Status: SHIPPED | OUTPATIENT
Start: 2017-12-21 | End: 2018-12-20

## 2017-12-21 RX ORDER — FERROUS SULFATE 325(65) MG
325 TABLET ORAL
Qty: 30 TABLET | Refills: 4 | Status: SHIPPED | OUTPATIENT
Start: 2017-12-22 | End: 2018-01-29

## 2017-12-21 RX ORDER — OXYCODONE HYDROCHLORIDE AND ACETAMINOPHEN 5; 325 MG/1; MG/1
1 TABLET ORAL EVERY 4 HOURS PRN
Qty: 30 TABLET | Refills: 0 | Status: SHIPPED | OUTPATIENT
Start: 2017-12-21 | End: 2017-12-28

## 2017-12-21 RX ORDER — FERROUS SULFATE 325(65) MG
325 TABLET ORAL
Status: DISCONTINUED | OUTPATIENT
Start: 2017-12-21 | End: 2017-12-21 | Stop reason: HOSPADM

## 2017-12-21 RX ORDER — IBUPROFEN 600 MG/1
600 TABLET ORAL EVERY 6 HOURS PRN
Qty: 30 TABLET | Refills: 2 | Status: SHIPPED | OUTPATIENT
Start: 2017-12-21 | End: 2018-01-29

## 2017-12-21 RX ORDER — PSEUDOEPHEDRINE HCL 30 MG
1 TABLET ORAL 2 TIMES DAILY PRN
Qty: 60 CAPSULE | Refills: 2 | Status: SHIPPED | OUTPATIENT
Start: 2017-12-21 | End: 2018-01-29

## 2017-12-21 RX ADMIN — IBUPROFEN 600 MG: 600 TABLET ORAL at 07:46

## 2017-12-21 RX ADMIN — DOCUSATE SODIUM 100 MG: 100 CAPSULE, LIQUID FILLED ORAL at 07:46

## 2017-12-21 RX ADMIN — PRENATAL VIT W/ FE FUMARATE-FA TAB 27-0.8 MG 1 TABLET: 27-0.8 TAB at 07:46

## 2017-12-21 RX ADMIN — OXYCODONE AND ACETAMINOPHEN 2 TABLET: 5; 325 TABLET ORAL at 07:46

## 2017-12-21 RX ADMIN — Medication 325 MG: at 07:46

## 2017-12-21 RX ADMIN — SIMETHICONE CHEW TAB 80 MG 80 MG: 80 TABLET ORAL at 07:46

## 2017-12-21 NOTE — PLAN OF CARE
Problem: Patient Care Overview (Adult)  Goal: Plan of Care Review  Outcome: Outcome(s) achieved Date Met: 17 0809   Coping/Psychosocial Response Interventions   Plan Of Care Reviewed With patient   Patient Care Overview   Progress improving       Problem: Breastfeeding (Adult,NICU,[place],Obstetrics,Pediatric)  Goal: Signs and Symptoms of Listed Potential Problems Will be Absent or Manageable (Breastfeeding)  Outcome: Ongoing (interventions implemented as appropriate)   17 0809   Breastfeeding   Problems Assessed (Breastfeeding) all   Problems Present (Breastfeeding) none       Problem: Postpartum ( Delivery) (Adult)  Goal: Signs and Symptoms of Listed Potential Problems Will be Absent or Manageable (Postpartum)  Outcome: Ongoing (interventions implemented as appropriate)   17 0809   Postpartum ( Delivery)   Problems Assessed (Postpartum ) all   Problems Present (Postpartum ) pain

## 2018-01-04 ENCOUNTER — POSTPARTUM VISIT (OUTPATIENT)
Dept: OBSTETRICS AND GYNECOLOGY | Facility: CLINIC | Age: 23
End: 2018-01-04

## 2018-01-04 VITALS
DIASTOLIC BLOOD PRESSURE: 78 MMHG | SYSTOLIC BLOOD PRESSURE: 110 MMHG | BODY MASS INDEX: 23.74 KG/M2 | WEIGHT: 134 LBS | HEIGHT: 63 IN

## 2018-01-04 DIAGNOSIS — Z98.891 STATUS POST CESAREAN SECTION: ICD-10-CM

## 2018-01-04 DIAGNOSIS — Z30.09 BIRTH CONTROL COUNSELING: ICD-10-CM

## 2018-01-04 PROCEDURE — 0503F POSTPARTUM CARE VISIT: CPT | Performed by: OBSTETRICS & GYNECOLOGY

## 2018-01-04 NOTE — PROGRESS NOTES
Subjective   Chasity Chamorro is a 22 y.o. female.     History of Present Illness  Patient is 2 weeks post .  Incision is healed beautifully.  Patient wants Nexplanon for birth control.  She is breast-feeding and this is going well.  She has mild postpartum depression but does not want medication.      The following portions of the patient's history were reviewed and updated as appropriate: allergies, current medications, past family history, past medical history, past social history, past surgical history and problem list.    Review of Systems   Constitutional: Negative.    Respiratory: Negative.    Cardiovascular: Negative.    Gastrointestinal: Negative.    Genitourinary: Negative.    Psychiatric/Behavioral:        Mild postpartum depression, patient doesn't want medication       Objective   Physical Exam   Constitutional: She appears well-developed and well-nourished.   Abdominal:       Nursing note and vitals reviewed.      Assessment/Plan   Chasity was seen today for postpartum care.    Diagnoses and all orders for this visit:    Postpartum care and examination of lactating mother    Status post  section    Birth control counseling       Return one week for Nexplanon    Hakeem Retana MD

## 2018-01-29 ENCOUNTER — OFFICE VISIT (OUTPATIENT)
Dept: OBSTETRICS AND GYNECOLOGY | Facility: CLINIC | Age: 23
End: 2018-01-29

## 2018-01-29 VITALS
SYSTOLIC BLOOD PRESSURE: 100 MMHG | DIASTOLIC BLOOD PRESSURE: 60 MMHG | WEIGHT: 123 LBS | HEIGHT: 63 IN | BODY MASS INDEX: 21.79 KG/M2

## 2018-01-29 DIAGNOSIS — Z98.891 STATUS POST CESAREAN SECTION: ICD-10-CM

## 2018-01-29 DIAGNOSIS — Z30.09 BIRTH CONTROL COUNSELING: Primary | ICD-10-CM

## 2018-01-29 DIAGNOSIS — N76.0 VAGINAL INFECTION: ICD-10-CM

## 2018-01-29 PROCEDURE — 99213 OFFICE O/P EST LOW 20 MIN: CPT | Performed by: OBSTETRICS & GYNECOLOGY

## 2018-01-29 RX ORDER — MEDROXYPROGESTERONE ACETATE 150 MG/ML
150 INJECTION, SUSPENSION INTRAMUSCULAR ONCE
Status: DISCONTINUED | OUTPATIENT
Start: 2018-01-29 | End: 2018-01-29

## 2018-01-29 RX ORDER — MEDROXYPROGESTERONE ACETATE 150 MG/ML
150 INJECTION, SUSPENSION INTRAMUSCULAR
Qty: 1 ML | Refills: 3 | Status: SHIPPED | OUTPATIENT
Start: 2018-01-29 | End: 2018-12-20

## 2018-01-29 RX ORDER — MEDROXYPROGESTERONE ACETATE 150 MG/ML
150 INJECTION, SUSPENSION INTRAMUSCULAR ONCE
Status: COMPLETED | OUTPATIENT
Start: 2018-01-29 | End: 2018-01-29

## 2018-01-29 RX ADMIN — MEDROXYPROGESTERONE ACETATE 150 MG: 150 INJECTION, SUSPENSION INTRAMUSCULAR at 12:09

## 2018-01-29 NOTE — PROGRESS NOTES
Subjective   Chasity Chamorro is a 22 y.o. female.     History of Present Illness   6 weeks post primary , she is breast-feeding is going well, her incision is healed well, she is having no postpartum depression, and she wants Depo-Provera for birth control.  Patient complains of vaginal infection and wants a vaginosis panel for screening.      The following portions of the patient's history were reviewed and updated as appropriate: allergies, current medications, past family history, past medical history, past social history, past surgical history and problem list.    Review of Systems   Constitutional: Negative.    Respiratory: Negative.    Cardiovascular: Negative.    Gastrointestinal: Negative.    Genitourinary: Negative.    Psychiatric/Behavioral: Negative.        Objective   Physical Exam    Assessment/Plan   Chasity was seen today for contraception.    Diagnoses and all orders for this visit:    Birth control counseling  -     MedroxyPROGESTERone Acetate 150 MG/ML suspension prefilled syringe; Inject 150 mg into the shoulder, thigh, or buttocks Every 3 (Three) Months.    Postpartum care and examination of lactating mother    Status post  section    Vaginal infection  -     Gardnerella vaginalis, Trichomonas vaginalis, Candida albicans, PCR - Swab, Vagina       Return 3 months for repeat Depo-Provera    Hakeem Retana MD

## 2018-02-19 ENCOUNTER — TELEPHONE (OUTPATIENT)
Dept: OBSTETRICS AND GYNECOLOGY | Facility: CLINIC | Age: 23
End: 2018-02-19

## 2018-02-19 NOTE — TELEPHONE ENCOUNTER
The patient was called to report bacterial vaginosis results.  There was no answer and no message can be left.    Hakeem Retana MD

## 2018-02-22 ENCOUNTER — TELEPHONE (OUTPATIENT)
Dept: OBSTETRICS AND GYNECOLOGY | Facility: CLINIC | Age: 23
End: 2018-02-22

## 2018-02-22 DIAGNOSIS — N76.0 BACTERIAL VAGINAL INFECTION: Primary | ICD-10-CM

## 2018-02-22 DIAGNOSIS — B96.89 BACTERIAL VAGINAL INFECTION: Primary | ICD-10-CM

## 2018-02-22 RX ORDER — METRONIDAZOLE 500 MG/1
500 TABLET ORAL 2 TIMES DAILY
Qty: 14 TABLET | Refills: 1 | Status: SHIPPED | OUTPATIENT
Start: 2018-02-22 | End: 2018-03-01

## 2018-02-22 NOTE — TELEPHONE ENCOUNTER
The patient was called and informed that the vaginosis panel showed a bacterial vaginal vaginosis.  Flagyl was sent to her pharmacy for treatment.    Hakeem Retana MD

## 2018-10-26 ENCOUNTER — OFFICE VISIT (OUTPATIENT)
Dept: OBSTETRICS AND GYNECOLOGY | Facility: CLINIC | Age: 23
End: 2018-10-26

## 2018-10-26 ENCOUNTER — APPOINTMENT (OUTPATIENT)
Dept: LAB | Facility: HOSPITAL | Age: 23
End: 2018-10-26

## 2018-10-26 VITALS
BODY MASS INDEX: 24.24 KG/M2 | WEIGHT: 136.8 LBS | RESPIRATION RATE: 14 BRPM | SYSTOLIC BLOOD PRESSURE: 100 MMHG | HEIGHT: 63 IN | DIASTOLIC BLOOD PRESSURE: 70 MMHG

## 2018-10-26 DIAGNOSIS — N89.8 VAGINAL DISCHARGE: Primary | ICD-10-CM

## 2018-10-26 DIAGNOSIS — Z11.3 ROUTINE SCREENING FOR STI (SEXUALLY TRANSMITTED INFECTION): ICD-10-CM

## 2018-10-26 LAB
HBV SURFACE AG SERPL QL IA: NORMAL
HCV AB SER DONR QL: NORMAL
HIV1+2 AB SER QL: NORMAL

## 2018-10-26 PROCEDURE — 86803 HEPATITIS C AB TEST: CPT | Performed by: OBSTETRICS & GYNECOLOGY

## 2018-10-26 PROCEDURE — 86592 SYPHILIS TEST NON-TREP QUAL: CPT | Performed by: OBSTETRICS & GYNECOLOGY

## 2018-10-26 PROCEDURE — 99213 OFFICE O/P EST LOW 20 MIN: CPT | Performed by: OBSTETRICS & GYNECOLOGY

## 2018-10-26 PROCEDURE — G0432 EIA HIV-1/HIV-2 SCREEN: HCPCS | Performed by: OBSTETRICS & GYNECOLOGY

## 2018-10-26 PROCEDURE — 87340 HEPATITIS B SURFACE AG IA: CPT | Performed by: OBSTETRICS & GYNECOLOGY

## 2018-10-26 PROCEDURE — 36415 COLL VENOUS BLD VENIPUNCTURE: CPT | Performed by: OBSTETRICS & GYNECOLOGY

## 2018-10-26 NOTE — PROGRESS NOTES
"    Subjective   Chief Complaint   Patient presents with   • Follow-up     BV and yeast infection     Chasity Chamorro is a 23 y.o. year old .  No LMP recorded.  She presents to be seen because of vaginal discharge. Patient reports that she has a light green vaginal discharge. Reports vaginal itching. Denies foul odor. She denies any new sexual partners. She denies douching or the use of scented soaps. She has a history of trichomonas, Chlamydia and HSV (last outbreak 3 months ago). She desires STI screening.     OTHER THINGS SHE WANTS TO DISCUSS TODAY:  Nothing else    The following portions of the patient's history were reviewed and updated as appropriate:current medications, allergies, past family history, past medical history, past social history and past surgical history    Smoking status: Never Smoker                                                              Smokeless tobacco: Never Used                        Review of Systems  Constitutional POS: nothing reported    NEG: anorexia or night sweats   Genitourinary POS: nothing reported    NEG: dysuria or hematuria   Gastointestinal POS: nothing reported    NEG: bloating, change in bowel habits, melena or reflux symptoms   Integument POS: nothing reported    NEG: moles that are changing in size, shape, color or rashes   Breast POS: nothing reported    NEG: persistent breast lump, skin dimpling or nipple discharge         Objective   /70   Resp 14   Ht 160 cm (63\")   Wt 62.1 kg (136 lb 12.8 oz)   Breastfeeding? No   BMI 24.23 kg/m²     General:  well developed; well nourished  no acute distress   Skin:  Not performed.   Thyroid: not examined   Lungs:  breathing is unlabored   Heart:  Not performed.   Breasts:  Not performed.   Abdomen: soft, non-tender; no masses  no umbilical or inginual hernias are present  no hepato-splenomegaly   Pelvis: Clinical staff was present for exam  External genitalia:  normal appearance of the external genitalia " including Bartholin's and [place]'s glands.  Vaginal:  normal pink mucosa without prolapse or lesions. discharge present -  white;  Cervix:  normal appearance.  Uterus:  normal size, shape and consistency.  Adnexa:  normal bimanual exam of the adnexa.     Lab Review   No data reviewed    Imaging   No data reviewed        Assessment   1. Vaginal Discharge  2. STI screening     Plan   1. The following tests were ordered today: HIV, RPR, STD swabs for GC, chlamydia and trichimoniasis and Hepatitis B&C, Vagnitis Panel.  It was explained to Chasity that all lab test should be back within the one week after they are performed. She will be notified about the results, regardless of the findings. If she has not been contacted by the office within 2 weeks after the test has been performed, it is her responsibility to contact us to learn about her results.  2. The importance of keeping all planned follow-up and taking all medications as prescribed was emphasized.  3. Follow up for annual exam in 8 months    No orders of the defined types were placed in this encounter.         This note was electronically signed.    Diana Aragon,   October 26, 2018    Note: Speech recognition transcription software may have been used to create portions of this document.  An attempt at proofreading has been made but errors in transcription could still be present.

## 2018-10-29 LAB — RPR SER QL: NORMAL

## 2018-10-30 ENCOUNTER — TELEPHONE (OUTPATIENT)
Dept: OBSTETRICS AND GYNECOLOGY | Facility: CLINIC | Age: 23
End: 2018-10-30

## 2018-10-30 DIAGNOSIS — B96.89 BACTERIAL VAGINOSIS: Primary | ICD-10-CM

## 2018-10-30 DIAGNOSIS — N76.0 BACTERIAL VAGINOSIS: Primary | ICD-10-CM

## 2018-10-30 DIAGNOSIS — B37.31 CANDIDAL VAGINITIS: ICD-10-CM

## 2018-10-30 RX ORDER — FLUCONAZOLE 150 MG/1
150 TABLET ORAL DAILY
Qty: 1 TABLET | Refills: 0 | Status: SHIPPED | OUTPATIENT
Start: 2018-10-30 | End: 2018-12-20

## 2018-10-30 RX ORDER — METRONIDAZOLE 500 MG/1
500 TABLET ORAL 2 TIMES DAILY
Qty: 14 TABLET | Refills: 0 | Status: SHIPPED | OUTPATIENT
Start: 2018-10-30 | End: 2018-11-06

## 2018-10-30 NOTE — TELEPHONE ENCOUNTER
----- Message from Diana Aragon DO sent at 10/30/2018  1:49 PM EDT -----  Please call the patient and let her know she has bacterial vaginosis and a yeast infection. I have sent a prescription to her pharmacy for Flagyl 500mg BID x7 days and Diflucan 150mg x1 dose. Her GC/Chlamydia/Trichomonas were all negative.

## 2018-10-30 NOTE — TELEPHONE ENCOUNTER
----- Message from Diana Aragon DO sent at 10/30/2018 11:29 AM EDT -----  Please call the patient and inform her that her HIV, Hepatitis B&C and RPR were all negative. Her vaginitis panel and GC/Chlamydia/Trichomonas testing have not come back yet.

## 2018-12-20 ENCOUNTER — OFFICE VISIT (OUTPATIENT)
Dept: OBSTETRICS AND GYNECOLOGY | Facility: CLINIC | Age: 23
End: 2018-12-20

## 2018-12-20 VITALS
WEIGHT: 145 LBS | RESPIRATION RATE: 14 BRPM | DIASTOLIC BLOOD PRESSURE: 68 MMHG | SYSTOLIC BLOOD PRESSURE: 106 MMHG | BODY MASS INDEX: 25.69 KG/M2

## 2018-12-20 DIAGNOSIS — N64.59 BREAST ENGORGEMENT: Primary | ICD-10-CM

## 2018-12-20 PROCEDURE — 99213 OFFICE O/P EST LOW 20 MIN: CPT | Performed by: OBSTETRICS & GYNECOLOGY

## 2018-12-20 RX ORDER — IBUPROFEN 600 MG/1
600 TABLET ORAL EVERY 6 HOURS PRN
Qty: 60 TABLET | Refills: 0 | Status: SHIPPED | OUTPATIENT
Start: 2018-12-20 | End: 2019-07-08

## 2018-12-20 NOTE — PROGRESS NOTES
Subjective   Chief Complaint   Patient presents with   • Breast Pain     Chasity Chamorro is a 23 y.o. year old .  No LMP recorded.  She presents to be seen because of breast engorgement. Patient has been breastfeeding her son since 2017. She recently discontinued 3 days ago and is experiencing discomfort from engorgement significantly worse on the left. She states that normally her left breast produced 6-14 ounces and right breast produced 2-4 ounces. She denies any erythema or fevers.    OTHER THINGS SHE WANTS TO DISCUSS TODAY:  Nothing else    The following portions of the patient's history were reviewed and updated as appropriate:current medications and allergies    Social History    Tobacco Use      Smoking status: Never Smoker      Smokeless tobacco: Never Used    Review of Systems  Constitutional POS: nothing reported    NEG: anorexia or night sweats   Genitourinary POS: nothing reported    NEG: dysuria or hematuria   Gastointestinal POS: nothing reported    NEG: bloating, change in bowel habits, melena or reflux symptoms   Integument POS: nothing reported    NEG: moles that are changing in size, shape, color or rashes   Breast POS: see HPI    NEG: see HPI         Objective   /68   Resp 14   Wt 65.8 kg (145 lb)   Breastfeeding? No   BMI 25.69 kg/m²     General:  well developed; well nourished  no acute distress   Skin:  Not performed.   Thyroid: not examined   Lungs:  breathing is unlabored   Heart:  Not performed.   Breasts:  Examined in supine position  Nipples normal without inversion, lesions or discharge  There are no palpable axillary nodes  Patient is lactating.  No areas of erythema or tenderness noted and the nipples are normal  significant engorgement noted in bilateral breast; L>R   Abdomen: Not performed.   Pelvis: Not performed.     Lab Review   No data reviewed    Imaging   No data reviewed        Assessment   1. Breast Engorgement     Plan   1. No signs of infection.  Instructed patient to wear tight fitting bra or wrap breasts with ace bandage. Rx for Ibuprofen. Instructed patient she may also take Tylenol. Gave patient reassurance that should resolve within 1-2 weeks. Call with any signs or symptoms of infection.  2. The importance of keeping all planned follow-up and taking all medications as prescribed was emphasized.  3. Follow up for recheck of engorgement in 2 weeks.    No orders of the defined types were placed in this encounter.         This note was electronically signed.    Diana Aragon, DO  December 20, 2018    Note: Speech recognition transcription software may have been used to create portions of this document.  An attempt at proofreading has been made but errors in transcription could still be present.

## 2019-01-04 RX ORDER — VALACYCLOVIR HYDROCHLORIDE 500 MG/1
500 TABLET, FILM COATED ORAL DAILY
Qty: 30 TABLET | Refills: 12 | Status: SHIPPED | OUTPATIENT
Start: 2019-01-04 | End: 2019-07-08 | Stop reason: SDUPTHER

## 2019-02-22 ENCOUNTER — OFFICE VISIT (OUTPATIENT)
Dept: OBSTETRICS AND GYNECOLOGY | Facility: CLINIC | Age: 24
End: 2019-02-22

## 2019-02-22 VITALS — SYSTOLIC BLOOD PRESSURE: 100 MMHG | DIASTOLIC BLOOD PRESSURE: 60 MMHG

## 2019-02-22 DIAGNOSIS — Z11.3 SCREEN FOR STD (SEXUALLY TRANSMITTED DISEASE): ICD-10-CM

## 2019-02-22 DIAGNOSIS — Z30.09 BIRTH CONTROL COUNSELING: ICD-10-CM

## 2019-02-22 DIAGNOSIS — N76.0 VAGINAL INFECTION: ICD-10-CM

## 2019-02-22 DIAGNOSIS — N91.2 AMENORRHEA DUE TO DEPO PROVERA: Primary | ICD-10-CM

## 2019-02-22 PROCEDURE — 99213 OFFICE O/P EST LOW 20 MIN: CPT | Performed by: OBSTETRICS & GYNECOLOGY

## 2019-02-22 NOTE — PROGRESS NOTES
Subjective   Chasity Chamorro is a 23 y.o. female is here today as a self referral.    Chief Complaint   Patient presents with   • Amenorrhea     c/o cramping as if a period is coming but does not.   • Vaginal Discharge     thinks she might have BV or yeast or both        History of Present Illness  Patient delivered approximately 14 months ago.  She breast-fed for 1 year and use Depo-Provera for birth control until November 2018.  Patient presents today because she has not had a menstrual period.  Patient is not sexually active at this time and does not want birth control.  She is worried that she has a vaginal infection due to increased discharge.  She wants screening for STD, yeast, and bacterial vaginosis.  The patient does not want to start birth control pills at the present time.  She desires to wait to see if her periods will start.  Counseling about amenorrhea due to Depo-Provera breast-feeding was accomplished.  Patient expressed an understanding of why she was not having a menstrual cycle    The following portions of the patient's history were reviewed and updated as appropriate: allergies, current medications, past family history, past medical history, past social history, past surgical history and problem list.    Review of Systems - Negative except for present illness    Objective   General:  well developed; well nourished  no acute distress   Skin:  Not performed.   Thyroid: not examined   Breasts:  Not performed.   Abdomen: Not performed.   Heart: Not performed   Lungs: Not performed   Pelvis: Clinical staff was present for exam  External genitalia:  normal appearance of the external genitalia including Bartholin's and [place]'s glands.  :  urethral meatus normal;  Vaginal:  normal pink mucosa without prolapse or lesions. discharge present -  white;  Cervix:  normal appearance. DNA cultures for STDs, yeast, and bacterial vaginosis done  Uterus:  normal size, shape and consistency. tenderness to palpation  is present;  Adnexa:  normal bimanual exam of the adnexa.  Rectal:  digital rectal exam not performed; anus visually normal appearing.         Assessment/Plan   Chasity was seen today for amenorrhea and vaginal discharge.    Diagnoses and all orders for this visit:    Amenorrhea due to Depo Provera    Birth control counseling    Screen for STD (sexually transmitted disease)  -     Chlamydia trachomatis, Neisseria gonorrhoeae, Trichomonas vaginalis, PCR - Swab, Vagina    Vaginal infection  -     Gardnerella vaginalis, Trichomonas vaginalis, Candida albicans, PCR - Swab, Vagina  -     Candida panel, PCR - Swab, Vagina          Return in 3 months    Hakeem Retana MD

## 2019-02-26 ENCOUNTER — TELEPHONE (OUTPATIENT)
Dept: OBSTETRICS AND GYNECOLOGY | Facility: CLINIC | Age: 24
End: 2019-02-26

## 2019-07-07 PROBLEM — Z34.90 PREGNANCY: Status: RESOLVED | Noted: 2017-07-22 | Resolved: 2019-07-07

## 2019-07-07 PROBLEM — Z01.419 WELL WOMAN EXAM: Status: ACTIVE | Noted: 2019-07-07

## 2019-07-08 ENCOUNTER — OFFICE VISIT (OUTPATIENT)
Dept: OBSTETRICS AND GYNECOLOGY | Facility: CLINIC | Age: 24
End: 2019-07-08

## 2019-07-08 ENCOUNTER — LAB (OUTPATIENT)
Dept: LAB | Facility: HOSPITAL | Age: 24
End: 2019-07-08

## 2019-07-08 VITALS
WEIGHT: 152.6 LBS | SYSTOLIC BLOOD PRESSURE: 118 MMHG | DIASTOLIC BLOOD PRESSURE: 70 MMHG | HEIGHT: 63 IN | BODY MASS INDEX: 27.04 KG/M2

## 2019-07-08 DIAGNOSIS — Z12.4 CERVICAL CANCER SCREENING: ICD-10-CM

## 2019-07-08 DIAGNOSIS — N92.6 IRREGULAR MENSES: ICD-10-CM

## 2019-07-08 DIAGNOSIS — Z01.419 WELL WOMAN EXAM: Primary | ICD-10-CM

## 2019-07-08 DIAGNOSIS — Z01.419 WELL WOMAN EXAM: ICD-10-CM

## 2019-07-08 DIAGNOSIS — N89.8 VAGINAL DISCHARGE: ICD-10-CM

## 2019-07-08 LAB
PROLACTIN SERPL-MCNC: 4.93 NG/ML (ref 4.79–23.3)
TSH SERPL DL<=0.05 MIU/L-ACNC: 0.67 MIU/ML (ref 0.27–4.2)

## 2019-07-08 PROCEDURE — 84146 ASSAY OF PROLACTIN: CPT | Performed by: OBSTETRICS & GYNECOLOGY

## 2019-07-08 PROCEDURE — 87086 URINE CULTURE/COLONY COUNT: CPT

## 2019-07-08 PROCEDURE — 87077 CULTURE AEROBIC IDENTIFY: CPT

## 2019-07-08 PROCEDURE — 81001 URINALYSIS AUTO W/SCOPE: CPT

## 2019-07-08 PROCEDURE — 36415 COLL VENOUS BLD VENIPUNCTURE: CPT | Performed by: OBSTETRICS & GYNECOLOGY

## 2019-07-08 PROCEDURE — 84443 ASSAY THYROID STIM HORMONE: CPT

## 2019-07-08 PROCEDURE — 87186 SC STD MICRODIL/AGAR DIL: CPT

## 2019-07-08 PROCEDURE — 99395 PREV VISIT EST AGE 18-39: CPT | Performed by: OBSTETRICS & GYNECOLOGY

## 2019-07-08 RX ORDER — VALACYCLOVIR HYDROCHLORIDE 500 MG/1
500 TABLET, FILM COATED ORAL DAILY
Qty: 30 TABLET | Refills: 12 | Status: SHIPPED | OUTPATIENT
Start: 2019-07-08 | End: 2020-04-09 | Stop reason: SDUPTHER

## 2019-07-08 NOTE — PROGRESS NOTES
Subjective   Chief Complaint   Patient presents with   • Gynecologic Exam     annual exam; c/o UTI, yeast infection, d/c, requesting Valtrex refill, requesting a PCP refill     Chasity Chamorro is a 24 y.o. year old  presenting to be seen for her annual exam.     SEXUAL Hx:  She is currently sexually active.  In the past year there there has been ONE new sexual partner.    Condoms are never used.  She would not like to be screened for STD's at today's exam.  Current birth control method: not using any form of contraception.  She is not trying to conceive but would be OK if she did get pregnant.  She is happy with her current method of contraception and does not want to discuss alternative methods of contraception.  MENSTRUAL Hx:   LMP reports she had spotting last month. Duration is variable    She is still nursing but reports she had breast augmentation in April and her son mostly uses it as a pacifier.    Last depo provera injection was November and she reports having 3-4 injections   Post-coital bleeding is absent.  Dysmenorrhea: moderate and affecting her activities of daily living  PMS: none  Her cycles ARE a source of concern for her that she wishes to discuss today.  HEALTH Hx:  She exercises regularly: yes.  She wears her seat belt: yes.  She has concerns about domestic violence: no.  OTHER THINGS SHE WANTS TO DISCUSS TODAY:  She just completed bactrim last week for a UTI diagnosed at an urgent care. She still has some pressure after peeing and urinary urgency.   She reports she is concerned she has a yeast infection due to a discharge.   She is also requesting a refill for valtrex for HSV suppression.   After the exam she reports she was also treated for BV last week at urgent care    The following portions of the patient's history were reviewed and updated as appropriate:problem list, current medications, allergies, past family history, past medical history, past social history and past surgical  "history.    Social History    Tobacco Use      Smoking status: Never Smoker      Smokeless tobacco: Never Used    Review of Systems  Constitutional POS: nothing reported    NEG: anorexia or night sweats   Genitourinary POS: urgency    NEG: dysuria or hematuria      Gastointestinal POS: nothing reported    NEG: bloating, change in bowel habits, melena or reflux symptoms   Integument POS: nothing reported    NEG: moles that are changing in size, shape, color or rashes   Breast POS: nothing reported    NEG: persistent breast lump, skin dimpling or nipple discharge        Objective   /70   Ht 160 cm (63\")   Wt 69.2 kg (152 lb 9.6 oz)   LMP  (LMP Unknown)   Breastfeeding? No   BMI 27.03 kg/m²     General:  well developed; well nourished  no acute distress   Skin:  No suspicious lesions seen   Thyroid: normal to inspection and palpation   Breasts:  Examined in supine position  Symmetric without masses or skin dimpling  Nipples normal without inversion, lesions or discharge  There are no palpable axillary nodes  Bilateral implants are noted without obvious palpable abnormalities   Abdomen: soft, non-tender; no masses  no umbilical or inguinal hernias are present  no hepato-splenomegaly  suprapubic tenderness   Pelvis: Clinical staff was present for exam  External genitalia:  normal appearance of the external genitalia including Bartholin's and [place]'s glands.  :  urethral meatus normal;  Vaginal:  normal pink mucosa without prolapse or lesions. discharge present -  watery and white; wet prep done: normal epithelial cells are present, clue cells are present and pseudo-hyphae are absent;  Cervix:  normal appearance.  Uterus:  normal size, shape and consistency. retroverted;  Adnexa:  normal bimanual exam of the adnexa.  Rectal:  digital rectal exam not performed; anus visually normal appearing.        Assessment   1. Normal GYN exam  2. Urinary urgency and suprapubic tenderness recently treated for " UTI  3. Vaginal discharge consistent with BV but just completed treatment   4. Irregular menses most likely due to recent depo provera use      Plan   1. Pap was done today.  If she does not receive the results of the Pap within 2 weeks  time, she was instructed to call to find out the results.  I explained to Chasity that the recommendations for Pap smear interval in a low risk patient's has lengthened to 3 years time.  I encouraged her to be seen yearly for a full physical exam including breast and pelvic exam even during the off years when PAP's will not be performed.  2. The following tests were ordered today: PRL and TSH.  It was explained to Chasity that all lab test should be back within the one week after they are performed. She will be notified about the results, regardless of the findings. If she has not been contacted by the office within 2 weeks after the test has been performed, it is her responsibility to contact us to learn about her results.  3. Urine pregnancy test and UA with reflex to urine culture if indicated.   4. Viewed I will call with results of urinalysis to see if she needs to be treated with an additional antibiotic for UTI  5. Reviewed to let us know if vaginal discharge symptoms persist for more than 2 to 3 weeks and we can always culture to make sure she was treated for the correct vaginal infection.  My exam today however was consistent with BV which she just completed treatment for.  6. Reviewed my recommendation for hormonal contraception in regards to irregular menses and she will let me know what she decides to do.  Provided booklets for Mirena and Nexplanon.  Also reviewed oral pills patch and ring.  7. No prescription was given or electronically sent at today's visit  8. The importance of keeping all planned follow-up and taking all medications as prescribed was emphasized.  9. Follow up for annual exam in one year    New Medications Ordered This Visit   Medications   • valACYclovir  (VALTREX) 500 MG tablet     Sig: Take 1 tablet by mouth Daily.     Dispense:  30 tablet     Refill:  12       Total time spent today with Chastiy  was 20 minutes (level 3).  Of this time, > 50% was spent face-to-face time coordinating care, answering her questions and counseling regarding pathophysiology of her presenting problem along with plans for any diagnositc work-up and treatment.      This note was electronically signed.    Mary Mistry MD  July 8, 2019    Note: Speech recognition transcription software may have been used to create portions of this document.  An attempt at proofreading has been made but errors in transcription could still be present.

## 2019-07-09 LAB
BACTERIA UR QL AUTO: ABNORMAL /HPF
BILIRUB UR QL STRIP: NEGATIVE
CLARITY UR: ABNORMAL
COLOR UR: YELLOW
GLUCOSE UR STRIP-MCNC: NEGATIVE MG/DL
HGB UR QL STRIP.AUTO: ABNORMAL
HYALINE CASTS UR QL AUTO: ABNORMAL /LPF
KETONES UR QL STRIP: NEGATIVE
LEUKOCYTE ESTERASE UR QL STRIP.AUTO: ABNORMAL
NITRITE UR QL STRIP: POSITIVE
PH UR STRIP.AUTO: 5.5 [PH] (ref 5–8)
PROT UR QL STRIP: ABNORMAL
RBC # UR: ABNORMAL /HPF
REF LAB TEST METHOD: ABNORMAL
SP GR UR STRIP: 1.02 (ref 1–1.03)
SQUAMOUS #/AREA URNS HPF: ABNORMAL /HPF
UROBILINOGEN UR QL STRIP: ABNORMAL
WBC UR QL AUTO: ABNORMAL /HPF

## 2019-07-10 ENCOUNTER — TELEPHONE (OUTPATIENT)
Dept: OBSTETRICS AND GYNECOLOGY | Facility: CLINIC | Age: 24
End: 2019-07-10

## 2019-07-10 LAB — BACTERIA SPEC AEROBE CULT: ABNORMAL

## 2019-07-10 RX ORDER — CIPROFLOXACIN 250 MG/1
250 TABLET, FILM COATED ORAL 2 TIMES DAILY
Qty: 6 TABLET | Refills: 0 | Status: SHIPPED | OUTPATIENT
Start: 2019-07-10 | End: 2019-07-12 | Stop reason: SDUPTHER

## 2019-07-10 NOTE — TELEPHONE ENCOUNTER
Patient urine culture is still positive for a UTI. She had reported being treated for bactrim at an urgent care center but the culture shows the bacteria is resistant. I sent ciprofloxacin to her pharmacy to take twice daily for 3 days if she can be informed. This should treat the bacteria in her urine.   In addition her TSH and Prolactin were both normal.     New Medications Ordered This Visit   Medications   • ciprofloxacin (CIPRO) 250 MG tablet     Sig: Take 1 tablet by mouth 2 (Two) Times a Day for 3 days.     Dispense:  6 tablet     Refill:  0         Thanks  Mary Mistry MD

## 2019-07-12 RX ORDER — CIPROFLOXACIN 250 MG/1
250 TABLET, FILM COATED ORAL 2 TIMES DAILY
Qty: 6 TABLET | Refills: 0 | Status: SHIPPED | OUTPATIENT
Start: 2019-07-12 | End: 2019-07-15

## 2019-07-12 NOTE — TELEPHONE ENCOUNTER
"Dr. Mistry patient   [phone] called patient regarding her urine culture being positive for a UTI and that Dr. Mistry sent her a prescription for Cipro to her pharmacy. Patient asked me which pharmacy did Dr. Mistry send the prescription to and I advised her that the Rx was sent to [facility] on [address]. Patient asked me to send the Rx to [facility] on [address]. I advised patient that there's no [facility] [address], there's a [facility] on [address] and patient states there is [facility] [address] [facility] near [facility] [address]. I advised patient that [facility] is on [address] so she must be talking about [facility] [address] and yes I can send her Rx there but I will probably be better for patient to [facility] [address] and have them call [facility] [address] to transfer her Rx over to them. Because if I call in the same Rx to [facility] and her Rx is already ready for her at [id number] [address] her insurance will not cover the new Rx. Her insurance will rejected it and say it's too early to fill and she may have to pay out of pocket for the prescription. The patient got mad and said \"why did Dr. Baylon call in the prescription before calling her first?\" I advised patient that someone from our office had called her on 07/10/2019 and her voicemail box was full so they were unable to leave a message. Patient states \"I'm a single mother and I can't afford to pay for that prescription.\" I advised patient she will not have to pay for the Rx if she calls [facility] and have them call [facility] to transfer her Rx. Patient states \" I don't have time to discuss my prescription with you around other people so I will just have to call you back later. She asked for our phone number, I gave her the phone number and she hung up on me.   "

## 2019-07-15 PROBLEM — R87.612 LGSIL ON PAP SMEAR OF CERVIX: Status: ACTIVE | Noted: 2019-07-15

## 2019-12-27 NOTE — TELEPHONE ENCOUNTER
PT. CALLED WOULD LIKE A REFILL ON VALTREX PLEASE CALL IN TO [facility] [address]. HER PHONE NUMBER [phone] IF YOU NEED TO SPEAK TO HER. THANK YOU, ROMAINE    abdominal pain

## 2020-01-23 DIAGNOSIS — Z36.89 ENCOUNTER TO ESTABLISH GESTATIONAL AGE USING ULTRASOUND: Primary | ICD-10-CM

## 2020-02-12 ENCOUNTER — TELEPHONE (OUTPATIENT)
Dept: OBSTETRICS AND GYNECOLOGY | Facility: CLINIC | Age: 25
End: 2020-02-12

## 2020-02-26 ENCOUNTER — DOCUMENTATION (OUTPATIENT)
Dept: OBSTETRICS AND GYNECOLOGY | Facility: CLINIC | Age: 25
End: 2020-02-26

## 2020-04-09 ENCOUNTER — TELEPHONE (OUTPATIENT)
Dept: OBSTETRICS AND GYNECOLOGY | Facility: CLINIC | Age: 25
End: 2020-04-09

## 2020-04-09 RX ORDER — METRONIDAZOLE 500 MG/1
500 TABLET ORAL 2 TIMES DAILY
Qty: 14 TABLET | Refills: 0 | Status: SHIPPED | OUTPATIENT
Start: 2020-04-09 | End: 2020-04-16

## 2020-04-09 RX ORDER — FLUCONAZOLE 150 MG/1
150 TABLET ORAL DAILY
Qty: 1 TABLET | Refills: 0 | Status: SHIPPED | OUTPATIENT
Start: 2020-04-09 | End: 2020-09-15

## 2020-04-09 RX ORDER — ONDANSETRON 4 MG/1
4 TABLET, FILM COATED ORAL EVERY 8 HOURS PRN
Qty: 30 TABLET | Refills: 0 | Status: SHIPPED | OUTPATIENT
Start: 2020-04-09 | End: 2020-09-15

## 2020-04-09 RX ORDER — VALACYCLOVIR HYDROCHLORIDE 500 MG/1
500 TABLET, FILM COATED ORAL DAILY
Qty: 30 TABLET | Refills: 12 | Status: SHIPPED | OUTPATIENT
Start: 2020-04-09 | End: 2021-08-11 | Stop reason: SDUPTHER

## 2020-04-09 NOTE — TELEPHONE ENCOUNTER
Please let the patient know I have sent a prescription for Flagyl to her pharmacy for presumed Bacterial Vaginosis. I have also sent refills for Valtrex. An albuterol inhaler is not documented in her chart nor has been previously prescribed by one of our providers. She will need to contact the physician who is associated with her current albuterol prescription.

## 2020-04-09 NOTE — TELEPHONE ENCOUNTER
Spoke with patient and advised her that Dr. Aragon sent in her prescriptions.  She wanted to know if we could send in Zofran to help with nausea from the Flagyl and also wanted to know if we could send in Diflucan because she stated that she will get a yeast infection after using the Flagyl.

## 2020-04-09 NOTE — TELEPHONE ENCOUNTER
Provider Name  Dr Aragon    Reason for Call  Patient states she has a vaginal bacterial infection, has questions, please call her, also requested refill on  inhaler, doesn't have a PCP at this time    Pharmacy Name  [facility]    Call Back Number  [phone]

## 2020-04-09 NOTE — TELEPHONE ENCOUNTER
Spoke with patient and she states that she has a lot of yellowish-white discharge and it has an odor.  States that it presented it last night and would like to have something sent into her pharmacy.  She would also like a refill on her albuterol and valtrex.    Her pharmacy is [facility] on [address].

## 2020-07-16 ENCOUNTER — TELEPHONE (OUTPATIENT)
Dept: OBSTETRICS AND GYNECOLOGY | Facility: CLINIC | Age: 25
End: 2020-07-16

## 2020-07-17 ENCOUNTER — TELEPHONE (OUTPATIENT)
Dept: OBSTETRICS AND GYNECOLOGY | Facility: CLINIC | Age: 25
End: 2020-07-17

## 2020-09-15 ENCOUNTER — LAB (OUTPATIENT)
Dept: LAB | Facility: HOSPITAL | Age: 25
End: 2020-09-15

## 2020-09-15 ENCOUNTER — OFFICE VISIT (OUTPATIENT)
Dept: INTERNAL MEDICINE | Facility: CLINIC | Age: 25
End: 2020-09-15

## 2020-09-15 VITALS
OXYGEN SATURATION: 100 % | BODY MASS INDEX: 25.59 KG/M2 | TEMPERATURE: 97.3 F | WEIGHT: 144.4 LBS | DIASTOLIC BLOOD PRESSURE: 68 MMHG | HEIGHT: 63 IN | SYSTOLIC BLOOD PRESSURE: 102 MMHG | HEART RATE: 85 BPM

## 2020-09-15 DIAGNOSIS — Z01.818 PRE-OPERATIVE CLEARANCE: Primary | ICD-10-CM

## 2020-09-15 DIAGNOSIS — F12.90 MARIJUANA SMOKER: ICD-10-CM

## 2020-09-15 DIAGNOSIS — Z01.818 PRE-OPERATIVE CLEARANCE: ICD-10-CM

## 2020-09-15 LAB
ALBUMIN SERPL-MCNC: 4.6 G/DL (ref 3.5–5.2)
ALBUMIN/GLOB SERPL: 1.5 G/DL
ALP SERPL-CCNC: 58 U/L (ref 39–117)
ALT SERPL W P-5'-P-CCNC: 11 U/L (ref 1–33)
ANION GAP SERPL CALCULATED.3IONS-SCNC: 11.4 MMOL/L (ref 5–15)
APTT PPP: 28.6 SECONDS (ref 24–37)
AST SERPL-CCNC: 14 U/L (ref 1–32)
BILIRUB SERPL-MCNC: 0.3 MG/DL (ref 0–1.2)
BUN SERPL-MCNC: 14 MG/DL (ref 6–20)
BUN/CREAT SERPL: 17.5 (ref 7–25)
CALCIUM SPEC-SCNC: 9.6 MG/DL (ref 8.6–10.5)
CHLORIDE SERPL-SCNC: 107 MMOL/L (ref 98–107)
CO2 SERPL-SCNC: 20.6 MMOL/L (ref 22–29)
CREAT SERPL-MCNC: 0.8 MG/DL (ref 0.57–1)
DEPRECATED RDW RBC AUTO: 38.8 FL (ref 37–54)
EOSINOPHIL # BLD MANUAL: 0.26 10*3/MM3 (ref 0–0.4)
EOSINOPHIL NFR BLD MANUAL: 4 % (ref 0.3–6.2)
ERYTHROCYTE [DISTWIDTH] IN BLOOD BY AUTOMATED COUNT: 14.5 % (ref 12.3–15.4)
GFR SERPL CREATININE-BSD FRML MDRD: 106 ML/MIN/1.73
GLOBULIN UR ELPH-MCNC: 3 GM/DL
GLUCOSE SERPL-MCNC: 93 MG/DL (ref 65–99)
HBA1C MFR BLD: 5.35 % (ref 4.8–5.6)
HCG SERPL QL: NEGATIVE
HCT VFR BLD AUTO: 37.1 % (ref 34–46.6)
HGB BLD-MCNC: 11.8 G/DL (ref 12–15.9)
HIV1+2 AB SER QL: NORMAL
INR PPP: 1.01 (ref 0.85–1.16)
LYMPHOCYTES # BLD MANUAL: 2.37 10*3/MM3 (ref 0.7–3.1)
LYMPHOCYTES NFR BLD MANUAL: 3 % (ref 5–12)
LYMPHOCYTES NFR BLD MANUAL: 37 % (ref 19.6–45.3)
MCH RBC QN AUTO: 23.7 PG (ref 26.6–33)
MCHC RBC AUTO-ENTMCNC: 31.8 G/DL (ref 31.5–35.7)
MCV RBC AUTO: 74.6 FL (ref 79–97)
MICROCYTES BLD QL: ABNORMAL
MONOCYTES # BLD AUTO: 0.19 10*3/MM3 (ref 0.1–0.9)
NEUTROPHILS # BLD AUTO: 3.52 10*3/MM3 (ref 1.7–7)
NEUTROPHILS NFR BLD MANUAL: 55 % (ref 42.7–76)
PLAT MORPH BLD: NORMAL
PLATELET # BLD AUTO: 260 10*3/MM3 (ref 140–450)
PMV BLD AUTO: 11.9 FL (ref 6–12)
POTASSIUM SERPL-SCNC: 3.8 MMOL/L (ref 3.5–5.2)
PROT SERPL-MCNC: 7.6 G/DL (ref 6–8.5)
PROTHROMBIN TIME: 13 SECONDS (ref 11.5–14)
RBC # BLD AUTO: 4.97 10*6/MM3 (ref 3.77–5.28)
SODIUM SERPL-SCNC: 139 MMOL/L (ref 136–145)
TSH SERPL DL<=0.05 MIU/L-ACNC: 0.6 UIU/ML (ref 0.27–4.2)
VARIANT LYMPHS NFR BLD MANUAL: 1 % (ref 0–5)
WBC # BLD AUTO: 6.4 10*3/MM3 (ref 3.4–10.8)
WBC MORPH BLD: NORMAL

## 2020-09-15 PROCEDURE — 85025 COMPLETE CBC W/AUTO DIFF WBC: CPT | Performed by: FAMILY MEDICINE

## 2020-09-15 PROCEDURE — 84443 ASSAY THYROID STIM HORMONE: CPT

## 2020-09-15 PROCEDURE — 93000 ELECTROCARDIOGRAM COMPLETE: CPT | Performed by: FAMILY MEDICINE

## 2020-09-15 PROCEDURE — G0432 EIA HIV-1/HIV-2 SCREEN: HCPCS

## 2020-09-15 PROCEDURE — 85730 THROMBOPLASTIN TIME PARTIAL: CPT

## 2020-09-15 PROCEDURE — 85007 BL SMEAR W/DIFF WBC COUNT: CPT | Performed by: FAMILY MEDICINE

## 2020-09-15 PROCEDURE — 80053 COMPREHEN METABOLIC PANEL: CPT | Performed by: FAMILY MEDICINE

## 2020-09-15 PROCEDURE — 83036 HEMOGLOBIN GLYCOSYLATED A1C: CPT

## 2020-09-15 PROCEDURE — 85610 PROTHROMBIN TIME: CPT

## 2020-09-15 PROCEDURE — 84703 CHORIONIC GONADOTROPIN ASSAY: CPT

## 2020-09-15 PROCEDURE — 36415 COLL VENOUS BLD VENIPUNCTURE: CPT

## 2020-09-15 PROCEDURE — 99204 OFFICE O/P NEW MOD 45 MIN: CPT | Performed by: FAMILY MEDICINE

## 2020-09-15 RX ORDER — ETONOGESTREL 68 MG/1
1 IMPLANT SUBCUTANEOUS ONCE
COMMUNITY
End: 2021-06-21

## 2020-09-15 RX ORDER — ALBUTEROL SULFATE 90 UG/1
AEROSOL, METERED RESPIRATORY (INHALATION)
COMMUNITY
Start: 2020-06-29

## 2020-09-15 NOTE — PROGRESS NOTES
Subjective     Chasity Chamorro is a 25 y.o. female.     Chief Complaint   Patient presents with   • Establish Care     new patient   • Pre-op Exam     lipo 360, Dr. Smith       History of Present Illness     Pt for new establish care , discuss the followings;    Pt scheduled for liposuction 360 on 10/22/202 With Dr Smith under [place]    H/o on/off Herpes G. For many years , no new flare up. On Valtrex PRN     Seasonal allergies ;c/o on/off cough with sneezing , runny nose , sx worse with weather changes, on Albu INH/PRN and allegra    Pt denies CP/SOB/Palpitaion or other sx.     No h/o heart dis / heart arrhythmia/ blood disorder.    No complication reported with previous surgeries.        The following portions of the patient's history were reviewed and updated as appropriate: allergies, current medications, past family history, past medical history, past social history, past surgical history and problem list.    Review of Systems   Constitutional: Negative for activity change, appetite change, chills, fatigue and fever.   HENT: Negative for congestion, ear discharge, ear pain, rhinorrhea, sore throat and trouble swallowing.    Eyes: Negative for blurred vision, double vision, discharge and visual disturbance.   Respiratory: Negative for apnea, cough, chest tightness, shortness of breath, wheezing and stridor.    Cardiovascular: Negative for chest pain, palpitations and leg swelling.   Gastrointestinal: Negative for abdominal distention, abdominal pain, constipation, diarrhea, nausea, vomiting and GERD.   Genitourinary: Negative for decreased urine volume, difficulty urinating and dysuria.   Musculoskeletal: Negative for arthralgias and back pain.   Skin: Negative for color change, dry skin, pallor, rash and skin lesions.   Neurological: Negative for dizziness, seizures, speech difficulty, headache and confusion.   Psychiatric/Behavioral: Negative for agitation, behavioral problems, decreased concentration, sleep  disturbance, suicidal ideas, depressed mood and stress.   All other systems reviewed and are negative.      Vitals:    09/15/20 0856   BP: 102/68   Pulse: 85   Temp: 97.3 °F (36.3 °C)   SpO2: 100%           09/15/20  0856   Weight: 65.5 kg (144 lb 6.4 oz)         Body mass index is 25.42 kg/m².      Current Outpatient Medications   Medication Sig Dispense Refill   • albuterol sulfate  (90 Base) MCG/ACT inhaler      • Etonogestrel (Nexplanon) 68 MG implant subdermal implant Inject 1 each into the appropriate area of the skin as directed by provider 1 (One) Time.     • valACYclovir (VALTREX) 500 MG tablet Take 1 tablet by mouth Daily. (Patient taking differently: Take 500 mg by mouth Daily As Needed (fever blisters).) 30 tablet 12     No current facility-administered medications for this visit.          ECG 12 Lead    Date/Time: 9/15/2020 9:28 AM  Performed by: Mamta Lau MD  Authorized by: Mamta Lau MD   Previous ECG: no previous ECG available  Rhythm: sinus bradycardia and sinus arrhythmia  Rate: bradycardic  Conduction: conduction normal  Other findings: T wave abnormality    Clinical impression: non-specific ECG            Objective     Physical Exam  Vitals signs and nursing note reviewed.   Constitutional:       Appearance: Normal appearance. She is well-developed.   HENT:      Head: Normocephalic.      Mouth/Throat:      Mouth: Mucous membranes are moist.   Eyes:      General: No scleral icterus.     Conjunctiva/sclera: Conjunctivae normal.   Neck:      Musculoskeletal: Normal range of motion.      Thyroid: No thyromegaly.   Cardiovascular:      Rate and Rhythm: Normal rate and regular rhythm.      Heart sounds: Normal heart sounds. No murmur.   Pulmonary:      Effort: Pulmonary effort is normal. No respiratory distress.      Breath sounds: Normal breath sounds. No stridor. No wheezing, rhonchi or rales.   Chest:      Chest wall: No tenderness.   Abdominal:      General: Bowel sounds are  normal. There is no distension.      Palpations: Abdomen is soft. There is no mass.      Tenderness: There is no abdominal tenderness. There is no guarding or rebound.      Hernia: No hernia is present.   Musculoskeletal: Normal range of motion.         General: No swelling or tenderness.   Skin:     General: Skin is warm.      Coloration: Skin is not jaundiced or pale.      Findings: No bruising, erythema, lesion or rash.   Neurological:      Mental Status: She is alert and oriented to person, place, and time.      Motor: No abnormal muscle tone.      Deep Tendon Reflexes: Reflexes normal.   Psychiatric:         Mood and Affect: Mood normal.         Behavior: Behavior normal.         Thought Content: Thought content normal.         Judgment: Judgment normal.             Assessment/Plan   Chasity was seen today for establish care and pre-op exam.    Diagnoses and all orders for this visit:    Pre-operative clearance  -     Comprehensive Metabolic Panel  -     CBC & Differential  -     Protime-INR; Future  -     APTT; Future  -     HIV-1/O/2 Ag/Ab w Reflex; Future  -     hCG, Serum, Qualitative; Future  -     Hemoglobin A1c; Future  -     TSH Rfx On Abnormal To Free T4; Future  -     Cancel: ECG 12 Lead  -     ECG 12 Lead    Marijuana smoker;  - Advised to quit smoking Marijuana at least 4 weeks before surgery    Labs results been reviewed 9/16, all within normal range, she is at low risk for surgery.  As above, has been  advised to quit smoking Marijuana at least 4 weeks before surgery    I have fully discussed the nature of the medical condition(s) risks, complications, management, safe and proper use of medications.     Encouraged medication compliance and the importance of keeping scheduled follow up appointments with me and any other providers.    Patient instructed to follow up with our office for results on any labs/imaging ordered during this visit.    Home care discussed  Screening for Depression done , please  see [facility] PHQ 2/9  All questions answered  Patient verbalizes understanding and agrees to treatment plan.

## 2020-09-16 ENCOUNTER — TELEPHONE (OUTPATIENT)
Dept: INTERNAL MEDICINE | Facility: CLINIC | Age: 25
End: 2020-09-16

## 2020-09-16 NOTE — TELEPHONE ENCOUNTER
----- Message from Mamta Lau MD sent at 9/16/2020  8:57 AM EDT -----  PLEASE , call for normal results  Please , fax all results with EKG and Medical clearance form to Dr Smith, FAX number; [phone]    thx  Mamta Lau MD

## 2020-10-16 ENCOUNTER — TELEPHONE (OUTPATIENT)
Dept: INTERNAL MEDICINE | Facility: CLINIC | Age: 25
End: 2020-10-16

## 2020-10-16 NOTE — TELEPHONE ENCOUNTER
Caller: Chasity Chamorro    Relationship: Self    Best call back number: [phone]    What form or medical record are you requesting:COMPREHENSIVE  METABOLIC PANEL AND CBC WITH COMPLETE BLOOD COUNT NEEDS TO BE FAXED AGAIN WITH PCP SIGNATURE    Who is requesting this form or medical record from you: PHYSICIAN (SURGEON EARLY SHARRI) FROM [facility]    How would you like to receive the form or medical records (pick-up, mail, fax):  XIf fax, what is the fax number: [phone]  If mail, what is the address:   If pick-up, provide patient with address and location details    Timeframe paperwork needed:ASAP PATIENT IS HAVING SURGERY ON 10/22/20    Additional notes:

## 2020-10-16 NOTE — TELEPHONE ENCOUNTER
PATIENT WAS ASKING IF THE FAX HAD GONE THROUGH... SHE IS ASKING FOR A CALL BACK ASAP BECAUSE THEY NEED IT BY Monday

## 2020-10-16 NOTE — TELEPHONE ENCOUNTER
Attempted to contact the patient at [phone], went straight to  and no VM box has been set up to [facility]. Patient needs to provide another fax number for these papers. I have tried multiple time with no success.     HUB please ask the patient for another number to fax to or if she would like to pick them up this afternoon.

## 2020-10-19 NOTE — TELEPHONE ENCOUNTER
Attempted to contact the patient at [phone], went straight to  and no VM box has been set up to [facility]. Patient needs to provide another fax number for these papers. I have tried multiple time with no success.      HUB please ask the patient for another number to fax to or if she would like to pick them up this afternoon.

## 2021-05-17 ENCOUNTER — TELEPHONE (OUTPATIENT)
Dept: INTERNAL MEDICINE | Facility: CLINIC | Age: 26
End: 2021-05-17

## 2021-05-17 NOTE — TELEPHONE ENCOUNTER
Pn pt expressed understanding    If light bleeding--> needs to monitor it , if more bleeding --> needs to go to ER or OBGYN  Needs to Keep her appoin on 5/20 for the PAP     Mamta Lau MD

## 2021-05-17 NOTE — TELEPHONE ENCOUNTER
If light bleeding--> needs to monitor it , if more bleeding --> needs to go to ER or OBGYN  Needs to Keep her appoin on 5/20 for the PAP    Mamta Lau MD

## 2021-05-17 NOTE — TELEPHONE ENCOUNTER
Pn pt stated should she go before she comes in for her appt on 5/20/21 for a pap? She stated that the bleeding is very light.    She needs to go to ER or see PELON Lau MD

## 2021-05-17 NOTE — TELEPHONE ENCOUNTER
Caller: Chasity Chamorro    Relationship to patient: Self    Best call back number: [phone]    Patient is needing: PATIENT STATED THAT SHE TOOK THE  PILL LAST WEEK AND IS STILL BLEEDING  PATIENT WANTED TO KNOW IF THAT WAS NORMAL    PLEASE ADVISE

## 2021-06-21 ENCOUNTER — OFFICE VISIT (OUTPATIENT)
Dept: INTERNAL MEDICINE | Facility: CLINIC | Age: 26
End: 2021-06-21

## 2021-06-21 VITALS
BODY MASS INDEX: 24.38 KG/M2 | WEIGHT: 137.6 LBS | HEART RATE: 95 BPM | OXYGEN SATURATION: 99 % | HEIGHT: 63 IN | TEMPERATURE: 97.5 F | SYSTOLIC BLOOD PRESSURE: 89 MMHG | DIASTOLIC BLOOD PRESSURE: 60 MMHG

## 2021-06-21 DIAGNOSIS — E86.0 MILD DEHYDRATION: ICD-10-CM

## 2021-06-21 DIAGNOSIS — Z00.00 ENCOUNTER FOR ANNUAL PHYSICAL EXAM: Primary | ICD-10-CM

## 2021-06-21 DIAGNOSIS — Z01.419 WOMEN'S ANNUAL ROUTINE GYNECOLOGICAL EXAMINATION: ICD-10-CM

## 2021-06-21 LAB
BILIRUB BLD-MCNC: NEGATIVE MG/DL
CLARITY, POC: CLEAR
COLOR UR: YELLOW
GLUCOSE UR STRIP-MCNC: NEGATIVE MG/DL
KETONES UR QL: NEGATIVE
LEUKOCYTE EST, POC: NEGATIVE
NITRITE UR-MCNC: NEGATIVE MG/ML
PH UR: 6.5 [PH] (ref 5–8)
PROT UR STRIP-MCNC: ABNORMAL MG/DL
RBC # UR STRIP: NEGATIVE /UL
SP GR UR: 1.02 (ref 1–1.03)
UROBILINOGEN UR QL: NORMAL

## 2021-06-21 PROCEDURE — 81003 URINALYSIS AUTO W/O SCOPE: CPT | Performed by: FAMILY MEDICINE

## 2021-06-21 PROCEDURE — 99395 PREV VISIT EST AGE 18-39: CPT | Performed by: FAMILY MEDICINE

## 2021-06-21 RX ORDER — DOXYCYCLINE HYCLATE 50 MG/1
324 CAPSULE, GELATIN COATED ORAL
COMMUNITY

## 2021-06-21 NOTE — PROGRESS NOTES
"  Patient Care Team:  Mamta Lau MD as PCP - General (Family Medicine)  Diana Aragon DO as Consulting Physician (Obstetrics and Gynecology)     Chief complaint: Patient is in today for a physical          Patient is a 26 y.o. female who presents for her yearly physical exam.     HPI     Last PAP was in 2019 at    Had misscarege recently , still has some spotting , following at [facility]   She is moving to [facility]     Health maintenance/lifestyle:  Immunization History   Administered Date(s) Administered   • Flu Vaccine Quad PF >36MO 2017   • HPV Quadrivalent 10/05/2007, 2007, 2008   • Influenza, Unspecified 2019           PHQ-2 Depression Screening  Little interest or pleasure in doing things? 0   Feeling down, depressed, or hopeless? 0   PHQ-2 Total Score 0     Social History     Tobacco Use   Smoking Status Never Smoker   Smokeless Tobacco Never Used     Social History     Substance and Sexual Activity   Alcohol Use No         Review of Systems   Constitutional: Negative for activity change, appetite change and chills.   Cardiovascular: Negative for chest pain, palpitations and leg swelling.   Genitourinary: Positive for menstrual problem.   All other systems reviewed and are negative.        History  Past Medical History:   Diagnosis Date   • Abnormal Pap smear of cervix 2019    LSIL   • Anxiety    • Bacterial vaginosis 2017   • Chlamydia     treated   • Depression    • Genital herpes    • Scoliosis     Diagnosed at age 12   • Urogenital trichomoniasis     treated      Past Surgical History:   Procedure Laterality Date   • BREAST AUGMENTATION  2019   •  SECTION N/A 2017    Procedure:  SECTION PRIMARY;  Surgeon: Hakeem Retana MD;  Location: [facility] LABOR DELIVERY;  Service:    • TONSILLECTOMY     • WISDOM TOOTH EXTRACTION        Allergies   Allergen Reactions   • Nuts Anaphylaxis     \"I'm allergic to all tree nuts and all fruits, my " "throat swells and tongue itches\"   • Fruit & Vegetable Daily [Nutritional Supplements] Swelling   • Penicillins Hives   • Latex Rash      Family History   Problem Relation Age of Onset   • Bipolar disorder Mother    • No Known Problems Father    • Diabetes Maternal Grandfather    • Diabetes Maternal Aunt    • Breast cancer Neg Hx    • Ovarian cancer Neg Hx    • Colon cancer Neg Hx      Social History     Socioeconomic History   • Marital status: Single     Spouse name: Not on file   • Number of children: Not on file   • Years of education: Not on file   • Highest education level: Not on file   Tobacco Use   • Smoking status: Never Smoker   • Smokeless tobacco: Never Used   Substance and Sexual Activity   • Alcohol use: No   • Drug use: Yes     Types: Marijuana   • Sexual activity: Yes     Partners: Male     Birth control/protection: None        Current Outpatient Medications:   •  albuterol sulfate  (90 Base) MCG/ACT inhaler, , Disp: , Rfl:   •  ferrous gluconate (FERGON) 324 MG tablet, Take 324 mg by mouth Daily With Breakfast., Disp: , Rfl:   •  valACYclovir (VALTREX) 500 MG tablet, Take 1 tablet by mouth Daily. (Patient taking differently: Take 500 mg by mouth Daily As Needed (fever blisters).), Disp: 30 tablet, Rfl: 12                  BP (!) 89/60   Pulse 95   Temp 97.5 °F (36.4 °C) (Temporal)   Ht 158.8 cm (62.5\")   Wt 62.4 kg (137 lb 9.6 oz)   SpO2 99%   BMI 24.77 kg/m²       Physical Exam  Vitals and nursing note reviewed. Exam conducted with a chaperone present.   Constitutional:       Appearance: She is well-developed. She is not ill-appearing or diaphoretic.   HENT:      Head: Normocephalic.      Right Ear: Tympanic membrane, ear canal and external ear normal.      Left Ear: Tympanic membrane, ear canal and external ear normal.      Nose: No congestion or rhinorrhea.      Mouth/Throat:      Mouth: Mucous membranes are dry.   Eyes:      Extraocular Movements: Extraocular movements intact.      " Conjunctiva/sclera: Conjunctivae normal.      Pupils: Pupils are equal, round, and reactive to light.   Neck:      Thyroid: No thyromegaly.      Comments: No enlarged thyroid    Cardiovascular:      Rate and Rhythm: Normal rate and regular rhythm.      Heart sounds: Normal heart sounds. No murmur heard.   No friction rub. No gallop.    Pulmonary:      Effort: Pulmonary effort is normal. No respiratory distress.      Breath sounds: Normal breath sounds. No stridor. No wheezing, rhonchi or rales.   Abdominal:      General: Bowel sounds are normal. There is no distension.      Palpations: Abdomen is soft. There is no mass.      Tenderness: There is no abdominal tenderness. There is no guarding or rebound.      Hernia: No hernia is present. There is no hernia in the left inguinal area or right inguinal area.   Genitourinary:     General: Normal vulva.      Exam position: Lithotomy position.      Pubic Area: No rash.       Labia:         Right: No rash, tenderness, lesion or injury.         Left: No tenderness or injury.       Urethra: No urethral lesion.      Vagina: Normal. No erythema or bleeding.      Cervix: Cervical bleeding present. No discharge.      Uterus: Normal. Not enlarged.       Adnexa:         Right: No mass or tenderness.          Left: No mass or tenderness.     Musculoskeletal:         General: Normal range of motion.      Cervical back: Neck supple.   Lymphadenopathy:      Lower Body: No right inguinal adenopathy. No left inguinal adenopathy.   Skin:     General: Skin is warm.      Coloration: Skin is not pale.      Findings: No erythema or rash.   Neurological:      General: No focal deficit present.      Mental Status: She is alert and oriented to person, place, and time.      Cranial Nerves: No cranial nerve deficit.      Sensory: No sensory deficit.      Motor: No weakness or abnormal muscle tone.      Coordination: Coordination normal.      Gait: Gait normal.      Deep Tendon Reflexes: Reflexes  normal.   Psychiatric:         Mood and Affect: Mood normal.         Behavior: Behavior normal.         Thought Content: Thought content normal.         Judgment: Judgment normal.                   Diagnoses and all orders for this visit:    1. Encounter for annual physical exam (Primary)  -     POCT urinalysis dipstick, automated    2. Women's annual routine gynecological examination  -     Liquid-based Pap Smear, Screening; Future    3. Mild dehydration;  - Advised to keep self well hydrated    Discussed with pt; Regular exercise, healthy diet. Calcium intake, Sunscreen use encouraged.     Follow up: Return in about 1 year (around 6/21/2022) for physical.  Plan of care discussed with pt. They verbalized understanding and agreement.     Mamta Lau MD   6/21/2021   23:55 EDT             Recommendations discussed with patient for decreasing NAOMI symptoms: eliminate foods that may trigger symptoms, avoid alcohol at bedtime, and avoid lying down after 3 hours of eating.  Common irritating foods include: chocolate, garlic, onions, citrus fruits, coffee, alcohol, highly seasoned foods and carbonated beverages.

## 2021-06-21 NOTE — PATIENT INSTRUCTIONS
Pap Test  Why am I having this test?  A Pap test, also called a Pap smear, is a screening test to check for signs of:  · Cancer of the vagina, cervix, and uterus. The cervix is the lower part of the uterus that opens into the vagina.  · Infection.  · Changes that may be a sign that cancer is developing (precancerous changes).  Women need this test on a regular basis. In general, you should have a Pap test every 3 years until you reach menopause or age 65. Women aged 30-60 may choose to have their Pap test done at the same time as an HPV (human papillomavirus) test every 5 years (instead of every 3 years).  Your health care provider may recommend having Pap tests more or less often depending on your medical conditions and past Pap test results.  What kind of sample is taken?    Your health care provider will collect a sample of cells from the surface of your cervix. This will be done using a small cotton swab, plastic spatula, or brush. This sample is often collected during a pelvic exam, when you are lying on your back on an exam table with feet in footrests (stirrups).  In some cases, fluids (secretions) from the cervix or vagina may also be collected.  How do I prepare for this test?  · Be aware of where you are in your menstrual cycle. If you are menstruating on the day of the test, you may be asked to reschedule.  · You may need to reschedule if you have a known vaginal infection on the day of the test.  · Follow instructions from your health care provider about:  ? Changing or stopping your regular medicines. Some medicines can cause abnormal test results, such as digitalis and tetracycline.  ? Avoiding douching or taking a bath the day before or the day of the test.  Tell a health care provider about:  · Any allergies you have.  · All medicines you are taking, including vitamins, herbs, eye drops, creams, and over-the-counter medicines.  · Any blood disorders you have.  · Any surgeries you have had.  · Any  medical conditions you have.  · Whether you are pregnant or may be pregnant.  How are the results reported?  Your test results will be reported as either abnormal or normal.  A false-positive result can occur. A false positive is incorrect because it means that a condition is present when it is not.  A false-negative result can occur. A false negative is incorrect because it means that a condition is not present when it is.  What do the results mean?  A normal test result means that you do not have signs of cancer of the vagina, cervix, or uterus.  An abnormal result may mean that you have:  · Cancer. A Pap test by itself is not enough to diagnose cancer. You will have more tests done in this case.  · Precancerous changes in your vagina, cervix, or uterus.  · Inflammation of the cervix.  · An STD (sexually transmitted disease).  · A fungal infection.  · A parasite infection.  Talk with your health care provider about what your results mean.  Questions to ask your health care provider  Ask your health care provider, or the department that is doing the test:  · When will my results be ready?  · How will I get my results?  · What are my treatment options?  · What other tests do I need?  · What are my next steps?  Summary  · In general, women should have a Pap test every 3 years until they reach menopause or age 65.  · Your health care provider will collect a sample of cells from the surface of your cervix. This will be done using a small cotton swab, plastic spatula, or brush.  · In some cases, fluids (secretions) from the cervix or vagina may also be collected.  This information is not intended to replace advice given to you by your health care provider. Make sure you discuss any questions you have with your health care provider.  Document Revised: 08/27/2018 Document Reviewed: 08/27/2018  Elsevier Patient Education © 2021 [employer]

## 2021-06-29 ENCOUNTER — TELEPHONE (OUTPATIENT)
Dept: INTERNAL MEDICINE | Facility: CLINIC | Age: 26
End: 2021-06-29

## 2021-06-29 NOTE — TELEPHONE ENCOUNTER
----- Message from Mamta Lau MD sent at 6/29/2021  9:41 AM EDT -----  PLEASE call for PAP results  The specimen is not enough , needs to be redone.

## 2021-08-11 ENCOUNTER — OFFICE VISIT (OUTPATIENT)
Dept: INTERNAL MEDICINE | Facility: CLINIC | Age: 26
End: 2021-08-11

## 2021-08-11 VITALS
OXYGEN SATURATION: 99 % | SYSTOLIC BLOOD PRESSURE: 108 MMHG | TEMPERATURE: 97.3 F | DIASTOLIC BLOOD PRESSURE: 76 MMHG | HEIGHT: 63 IN | BODY MASS INDEX: 24.75 KG/M2 | HEART RATE: 65 BPM | RESPIRATION RATE: 16 BRPM | WEIGHT: 139.69 LBS

## 2021-08-11 DIAGNOSIS — Z01.419 WOMEN'S ANNUAL ROUTINE GYNECOLOGICAL EXAMINATION: Primary | ICD-10-CM

## 2021-08-11 DIAGNOSIS — A60.1 HERPES SIMPLEX INFECTION OF PERIANAL SKIN: ICD-10-CM

## 2021-08-11 DIAGNOSIS — Z30.013 ENCOUNTER FOR INITIAL PRESCRIPTION OF INJECTABLE CONTRACEPTIVE: ICD-10-CM

## 2021-08-11 LAB
B-HCG UR QL: NEGATIVE
INTERNAL NEGATIVE CONTROL: NORMAL
INTERNAL POSITIVE CONTROL: NORMAL
Lab: NORMAL

## 2021-08-11 PROCEDURE — 81025 URINE PREGNANCY TEST: CPT | Performed by: FAMILY MEDICINE

## 2021-08-11 PROCEDURE — 99214 OFFICE O/P EST MOD 30 MIN: CPT | Performed by: FAMILY MEDICINE

## 2021-08-11 PROCEDURE — 96372 THER/PROPH/DIAG INJ SC/IM: CPT | Performed by: FAMILY MEDICINE

## 2021-08-11 RX ORDER — VALACYCLOVIR HYDROCHLORIDE 500 MG/1
500 TABLET, FILM COATED ORAL 2 TIMES DAILY
Qty: 14 TABLET | Refills: 2 | Status: SHIPPED | OUTPATIENT
Start: 2021-08-11

## 2021-08-11 RX ORDER — MEDROXYPROGESTERONE ACETATE 150 MG/ML
150 INJECTION, SUSPENSION INTRAMUSCULAR ONCE
Status: COMPLETED | OUTPATIENT
Start: 2021-08-11 | End: 2021-08-11

## 2021-08-11 RX ORDER — MEDROXYPROGESTERONE ACETATE 150 MG/ML
150 INJECTION, SUSPENSION INTRAMUSCULAR
Qty: 1 EACH | Refills: 0 | Status: SHIPPED | OUTPATIENT
Start: 2021-08-11 | End: 2021-10-28 | Stop reason: SDUPTHER

## 2021-08-11 RX ADMIN — MEDROXYPROGESTERONE ACETATE 150 MG: 150 INJECTION, SUSPENSION INTRAMUSCULAR at 11:50

## 2021-08-11 NOTE — PATIENT INSTRUCTIONS
Medroxyprogesterone injection [Contraceptive]  What is this medicine?  MEDROXYPROGESTERONE (me DROX ee proe YVONNE te alexi) contraceptive injections prevent pregnancy. They provide effective birth control for 3 months. Depo-SubQ Provera 104 injection is also used for treating pain related to endometriosis.  This medicine may be used for other purposes; ask your health care provider or pharmacist if you have questions.  COMMON BRAND NAME(S): Depo-Provera, Depo-subQ Provera 104  What should I tell my health care provider before I take this medicine?  They need to know if you have any of these conditions:  · asthma  · blood clots  · breast cancer or family history of breast cancer  · depression  · diabetes  · eating disorder (anorexia nervosa)  · heart attack  · high blood pressure  · HIV infection or AIDS  · if you often drink alcohol  · kidney disease  · liver disease  · migraine headaches  · osteoporosis, weak bones  · seizures  · stroke  · tobacco smoker  · vaginal bleeding  · an unusual or allergic reaction to medroxyprogesterone, other hormones, medicines, foods, dyes, or preservatives  · pregnant or trying to get pregnant  · breast-feeding  How should I use this medicine?  Depo-Provera CI contraceptive injection is given into a muscle. Depo-subQ Provera 104 injection is given under the skin. It is given by a health care provider in a hospital or clinic setting. The injection is usually given during the first 5 days after the start of a menstrual period or 6 weeks after delivery of a baby.  A patient package insert for the product will be given with each prescription and refill. Be sure to read this information carefully each time. The sheet may change often.  Talk to your pediatrician regarding the use of this medicine in children. Special care may be needed. These injections have been used in female children who have started having menstrual periods.  Overdosage: If you think you have taken too much of this  medicine contact a poison control center or emergency room at once.  NOTE: This medicine is only for you. Do not share this medicine with others.  What if I miss a dose?  Keep appointments for follow-up doses. You must get an injection once every 3 months. It is important not to miss your dose. Call your health care provider if you are unable to keep an appointment.  What may interact with this medicine?  · antibiotics or medicines for infections, especially rifampin and griseofulvin  · antivirals for HIV or hepatitis  · aprepitant  · armodafinil  · bexarotene  · bosentan  · medicines for seizures like carbamazepine, felbamate, oxcarbazepine, phenytoin, phenobarbital, primidone, topiramate  · mitotane  · modafinil  · Hollins's wort  This list may not describe all possible interactions. Give your health care provider a list of all the medicines, herbs, non-prescription drugs, or dietary supplements you use. Also tell them if you smoke, drink alcohol, or use illegal drugs. Some items may interact with your medicine.  What should I watch for while using this medicine?  This drug does not protect you against HIV infection (AIDS) or other sexually transmitted diseases.  Use of this product may cause you to lose calcium from your bones. Loss of calcium may cause weak bones (osteoporosis). Only use this product for more than 2 years if other forms of birth control are not right for you. The longer you use this product for birth control the more likely you will be at risk for weak bones. Ask your health care professional how you can keep strong bones.  You may have a change in bleeding pattern or irregular periods. Many females stop having periods while taking this drug.  If you have received your injections on time, your chance of being pregnant is very low. If you think you may be pregnant, see your health care professional as soon as possible.  Tell your health care professional if you want to get pregnant within the  next year. The effect of this medicine may last a long time after you get your last injection.  What side effects may I notice from receiving this medicine?  Side effects that you should report to your doctor or health care professional as soon as possible:  · allergic reactions like skin rash, itching or hives, swelling of the face, lips, or tongue  · blood clot (chest pain; shortness of breath; pain, swelling, or warmth in the leg)  · breast tenderness or discharge  · changes in emotions or moods  · changes in vision  · liver injury (dark yellow or brown urine; general ill feeling or flu-like symptoms; loss of appetite, right upper belly pain; unusually weak or tired, yellowing of the eyes or skin)  · persistent pain, pus, or bleeding at the injection site  · stroke (changes in vision; confusion; trouble speaking or understanding; severe headaches; sudden numbness or weakness of the face, arm or leg; trouble walking; dizziness; loss of balance or coordination)  · trouble breathing  Side effects that usually do not require medical attention (report to your doctor or health care professional if they continue or are bothersome):  · change in sex drive  · dizziness  · fluid retention  · headache  · irregular periods, spotting, or absent periods  · pain, redness, or irritation at site where injected  · stomach pain  · weight gain  This list may not describe all possible side effects. Call your doctor for medical advice about side effects. You may report side effects to FDA at 8-147-FDA-4094.  Where should I keep my medicine?  This injection is only given by a health care provider. It will not be stored at home.  NOTE: This sheet is a summary. It may not cover all possible information. If you have questions about this medicine, talk to your doctor, pharmacist, or health care provider.  © 2021 [employer]vier/Gold Standard (2021-02-03 10:29:21)

## 2021-08-11 NOTE — PROGRESS NOTES
Subjective     Chasity Chamorro is a 26 y.o. female.     Chief Complaint   Patient presents with   • Gynecologic Exam     repeat pap smear today   • Mass     lump under  scar, noticed about 2 month ago    • Contraception     she would like to discuss oral contraceptives       History of Present Illness     LAST pap was with  in  . Re done in 2021 but the specimen was not enough   Denies vag discharge   No urinary sx   Had misscarege few months ago , No more spotting     Request contraception , she has regular cycle , was on OCP but she forget to take it. She was on depo , request to be back on it.    Stated that she has recurrent genital herpes , taking valtrex with great help. As she is back and forth between [place] and [place] she request refill to keep it handy.    She has one baby by C/S, noticed a knot on the incision when she has the cycle , no discharge     The following portions of the patient's history were reviewed and updated as appropriate: allergies, current medications, past family history, past medical history, past social history, past surgical history and problem list.        Review of Systems   Genitourinary: Negative for menstrual problem, pelvic pain and pelvic pressure.       Vitals:    21   BP: 108/76   Pulse: 65   Resp: 16   Temp: 97.3 °F (36.3 °C)   SpO2: 99%           21   Weight: 63.4 kg (139 lb 11 oz)         Body mass index is 25.13 kg/m².      Current Outpatient Medications   Medication Sig Dispense Refill   • albuterol sulfate  (90 Base) MCG/ACT inhaler      • ferrous gluconate (FERGON) 324 MG tablet Take 324 mg by mouth Daily With Breakfast.     • medroxyPROGESTERone (Depo-Provera) 150 MG/ML injection Inject 1 mL into the appropriate muscle as directed by prescriber Every 3 (Three) Months. 1 each 0   • valACYclovir (VALTREX) 500 MG tablet Take 1 tablet by mouth 2 (Two) Times a Day. 14 tablet 2     No current facility-administered medications  for this visit.                Objective   Physical Exam  Vitals and nursing note reviewed. Exam conducted with a chaperone present.   HENT:      Mouth/Throat:      Mouth: Mucous membranes are moist.   Cardiovascular:      Rate and Rhythm: Normal rate and regular rhythm.      Heart sounds: Normal heart sounds.   Pulmonary:      Effort: Pulmonary effort is normal. No respiratory distress.      Breath sounds: Normal breath sounds. No stridor.   Abdominal:      General: Bowel sounds are normal. There is no distension.      Palpations: Abdomen is soft. There is no mass.      Tenderness: There is no abdominal tenderness. There is no guarding or rebound.      Hernia: No hernia is present. There is no hernia in the left inguinal area or right inguinal area.      Comments: Subcut. small nodule felt on the surgical incision /C/S, most likely the underneath stitches from the CS. Wound looks clean and well healed    Genitourinary:     Exam position: Lithotomy position.      Labia:         Right: No rash, tenderness or lesion.         Left: No rash, tenderness or lesion.       Urethra: No urethral lesion.      Vagina: Normal. No vaginal discharge, erythema, tenderness or bleeding.      Cervix: No cervical motion tenderness, discharge, friability, lesion or cervical bleeding.      Uterus: Not deviated and not enlarged.       Adnexa:         Right: No mass or tenderness.          Left: No mass or tenderness.     Lymphadenopathy:      Lower Body: No right inguinal adenopathy. No left inguinal adenopathy.   Skin:     General: Skin is warm.   Neurological:      Mental Status: She is alert and oriented to person, place, and time.   Psychiatric:         Behavior: Behavior normal.         Thought Content: Thought content normal.           Assessment/Plan   Diagnoses and all orders for this visit:    1. Women's annual routine gynecological examination (Primary)  -     Liquid-based Pap Smear, Screening; Future    2. Herpes simplex  infection of perianal skin  -     valACYclovir (VALTREX) 500 MG tablet; Take 1 tablet by mouth 2 (Two) Times a Day.  Dispense: 14 tablet; Refill: 2    3. Encounter for initial prescription of injectable contraceptive  -     medroxyPROGESTERone (Depo-Provera) 150 MG/ML injection; Inject 1 mL into the appropriate muscle as directed by prescriber Every 3 (Three) Months.  Dispense: 1 each; Refill: 0  -     POC Pregnancy, Urine        I have fully discussed the nature of the medical condition(s) risks, complications, management, safe and proper use of medications.   I have discussed the SIDE EFFECT OF MEDICATION and importance TO report any side effect , the patient expressed good understanding.  Encouraged medication compliance and the importance of keeping scheduled follow up appointments with me and any other providers.    Patient instructed to follow up with our office for results on any labs/imaging ordered during this visit.    Home care discussed  All questions answered  Patient verbalizes understanding and agrees to treatment plan.     Follow up: Return in about 3 months (around 11/11/2021) for to get another depo .

## 2021-08-18 ENCOUNTER — TELEPHONE (OUTPATIENT)
Dept: INTERNAL MEDICINE | Facility: CLINIC | Age: 26
End: 2021-08-18

## 2021-08-18 NOTE — TELEPHONE ENCOUNTER
Pn pt expressed understanding     Mamta Lau MD  [address] [facility]  PLEASE call for normal pap results

## 2021-10-28 ENCOUNTER — TELEPHONE (OUTPATIENT)
Dept: INTERNAL MEDICINE | Facility: CLINIC | Age: 26
End: 2021-10-28

## 2021-10-28 DIAGNOSIS — Z30.013 ENCOUNTER FOR INITIAL PRESCRIPTION OF INJECTABLE CONTRACEPTIVE: ICD-10-CM

## 2021-10-28 RX ORDER — MEDROXYPROGESTERONE ACETATE 150 MG/ML
150 INJECTION, SUSPENSION INTRAMUSCULAR
Qty: 1 EACH | Refills: 0 | Status: SHIPPED | OUTPATIENT
Start: 2021-10-28 | End: 2022-01-28 | Stop reason: SDUPTHER

## 2022-01-25 ENCOUNTER — LAB (OUTPATIENT)
Dept: LAB | Facility: HOSPITAL | Age: 27
End: 2022-01-25

## 2022-01-25 ENCOUNTER — TELEPHONE (OUTPATIENT)
Dept: INTERNAL MEDICINE | Facility: CLINIC | Age: 27
End: 2022-01-25

## 2022-01-25 ENCOUNTER — TELEMEDICINE (OUTPATIENT)
Dept: INTERNAL MEDICINE | Facility: CLINIC | Age: 27
End: 2022-01-25

## 2022-01-25 DIAGNOSIS — J02.9 SORE THROAT: Primary | ICD-10-CM

## 2022-01-25 DIAGNOSIS — R05.9 COUGH: ICD-10-CM

## 2022-01-25 DIAGNOSIS — J02.9 SORE THROAT: ICD-10-CM

## 2022-01-25 LAB
EXPIRATION DATE: NORMAL
INTERNAL CONTROL: NORMAL
Lab: NORMAL
S PYO AG THROAT QL: NEGATIVE

## 2022-01-25 PROCEDURE — 87880 STREP A ASSAY W/OPTIC: CPT | Performed by: FAMILY MEDICINE

## 2022-01-25 PROCEDURE — U0004 COV-19 TEST NON-CDC HGH THRU: HCPCS | Performed by: FAMILY MEDICINE

## 2022-01-25 PROCEDURE — 99213 OFFICE O/P EST LOW 20 MIN: CPT | Performed by: FAMILY MEDICINE

## 2022-01-25 NOTE — PROGRESS NOTES
Mode of Visit: Video  Location of patient: car  You have chosen to receive care through a telehealth visit.  Does the patient consent to use a video/audio connection for your medical care today? Yes  The visit included audio and video interaction. No technical issues occurred during this visit.         Subjective   Chasity Chamorro is a 26 y.o. female.     History of Present Illness     Video Visit was done today because of COVID-19.  patient has consented to receive care via Video Visit   Patient location ;car    CC; sore throat ,cough , congestion , sneezing ,  HA , NASAL DRAINAGE  ALL SX STARTED 2 WEEKS AGO, SX ON/OFF  No F,C   Tried OTC Rx with some help  No sick contact     Due for depo shot was due in 11/2021  She had 3 missed period , her cycle started 11 days        Current Outpatient Medications on File Prior to Visit   Medication Sig Dispense Refill   • albuterol sulfate  (90 Base) MCG/ACT inhaler      • ferrous gluconate (FERGON) 324 MG tablet Take 324 mg by mouth Daily With Breakfast.     • medroxyPROGESTERone (Depo-Provera) 150 MG/ML injection Inject 1 mL into the appropriate muscle as directed by prescriber Every 3 (Three) Months. 1 each 0   • valACYclovir (VALTREX) 500 MG tablet Take 1 tablet by mouth 2 (Two) Times a Day. 14 tablet 2     No current facility-administered medications on file prior to visit.       The following portions of the patient's history were reviewed and updated as appropriate: allergies, current medications, past family history, past medical history, past social history, past surgical history and problem list.    Review of Systems   HENT: Positive for congestion, postnasal drip, rhinorrhea and sinus pressure.    Respiratory: Positive for cough. Negative for shortness of breath and wheezing.        Objective   There were no vitals taken for this visit.  Physical Exam  Constitutional:       General: She is not in acute distress.     Appearance: She is not ill-appearing,  toxic-appearing or diaphoretic.   Neurological:      Mental Status: She is alert and oriented to person, place, and time.   Psychiatric:         Mood and Affect: Mood normal.         Behavior: Behavior normal.         Thought Content: Thought content normal.           Assessment/Plan   Diagnoses and all orders for this visit:    1. Sore throat (Primary)  -     POCT rapid strep A  -     COVID-19 PCR, [facility], NP SWAB IN [facility] VIRAL TRANSPORT MEDIA/ORAL SWISH 24-30 HR TAT - Swab, Oropharynx; Future    2. Cough  -     COVID-19 PCR, [facility], NP SWAB IN [facility] VIRAL TRANSPORT MEDIA/ORAL SWISH 24-30 HR TAT - Swab, Oropharynx; Future                   I have reviewed the limitations of a telehealth visit (such as lack of a physical exam and unable to obtain vital signs) and advised the patient that they may need to follow up for an office visit should their symptoms or concerns persist, worsen, or change.  Patient was encouraged to keep me informed of any acute changes, lack of improvement, or any new concerning symptoms.   I discussed my findings,recommendations, and plan of care was with the patient. They verbalized understanding and agreement.

## 2022-01-26 ENCOUNTER — TELEPHONE (OUTPATIENT)
Dept: INTERNAL MEDICINE | Facility: CLINIC | Age: 27
End: 2022-01-26

## 2022-01-26 LAB — SARS-COV-2 RNA NOSE QL NAA+PROBE: NOT DETECTED

## 2022-01-26 NOTE — TELEPHONE ENCOUNTER
----- Message from Mamta Lau MD sent at 1/26/2022  4:52 PM EST -----  PLEASE call for neg COVID results

## 2022-01-26 NOTE — TELEPHONE ENCOUNTER
CALLED [facility] WITH CB NUMBER AND SENT MYCHART MESSAGE Mamta Lau MD  [facility] [address] [facility]  PLEASE call for neg COVID results

## 2022-01-28 DIAGNOSIS — Z30.013 ENCOUNTER FOR INITIAL PRESCRIPTION OF INJECTABLE CONTRACEPTIVE: ICD-10-CM

## 2022-01-29 RX ORDER — MEDROXYPROGESTERONE ACETATE 150 MG/ML
150 INJECTION, SUSPENSION INTRAMUSCULAR
Qty: 1 EACH | Refills: 0 | Status: SHIPPED | OUTPATIENT
Start: 2022-01-29

## 2023-05-10 ENCOUNTER — LAB (OUTPATIENT)
Dept: INTERNAL MEDICINE | Facility: CLINIC | Age: 28
End: 2023-05-10
Payer: COMMERCIAL

## 2023-05-10 ENCOUNTER — OFFICE VISIT (OUTPATIENT)
Dept: INTERNAL MEDICINE | Facility: CLINIC | Age: 28
End: 2023-05-10
Payer: COMMERCIAL

## 2023-05-10 VITALS
HEIGHT: 63 IN | SYSTOLIC BLOOD PRESSURE: 108 MMHG | WEIGHT: 149 LBS | OXYGEN SATURATION: 98 % | BODY MASS INDEX: 26.4 KG/M2 | DIASTOLIC BLOOD PRESSURE: 62 MMHG | TEMPERATURE: 98.4 F | HEART RATE: 82 BPM

## 2023-05-10 DIAGNOSIS — R53.83 OTHER FATIGUE: ICD-10-CM

## 2023-05-10 DIAGNOSIS — R35.0 FREQUENCY OF URINATION AND POLYURIA: ICD-10-CM

## 2023-05-10 DIAGNOSIS — R35.89 FREQUENCY OF URINATION AND POLYURIA: Primary | ICD-10-CM

## 2023-05-10 DIAGNOSIS — Z11.3 SCREEN FOR STD (SEXUALLY TRANSMITTED DISEASE): ICD-10-CM

## 2023-05-10 DIAGNOSIS — R35.0 FREQUENCY OF URINATION AND POLYURIA: Primary | ICD-10-CM

## 2023-05-10 DIAGNOSIS — R35.89 FREQUENCY OF URINATION AND POLYURIA: ICD-10-CM

## 2023-05-10 LAB
25(OH)D3 SERPL-MCNC: 20 NG/ML (ref 30–100)
B-HCG UR QL: NEGATIVE
BILIRUB BLD-MCNC: NEGATIVE MG/DL
CLARITY, POC: ABNORMAL
COLOR UR: YELLOW
EXPIRATION DATE: ABNORMAL
EXPIRATION DATE: NORMAL
EXPIRATION DATE: NORMAL
FERRITIN SERPL-MCNC: 24.4 NG/ML (ref 13–150)
FOLATE SERPL-MCNC: 7.16 NG/ML (ref 4.78–24.2)
GLUCOSE UR STRIP-MCNC: NEGATIVE MG/DL
HBA1C MFR BLD: 5.4 % (ref 4.8–5.6)
HBA1C MFR BLD: 5.6 %
INTERNAL NEGATIVE CONTROL: NORMAL
INTERNAL POSITIVE CONTROL: NORMAL
IRON 24H UR-MRATE: 65 MCG/DL (ref 37–145)
IRON SATN MFR SERPL: 14 % (ref 20–50)
KETONES UR QL: NEGATIVE
LEUKOCYTE EST, POC: NEGATIVE
Lab: ABNORMAL
Lab: NORMAL
Lab: NORMAL
NITRITE UR-MCNC: NEGATIVE MG/ML
PH UR: 6 [PH] (ref 5–8)
PROT UR STRIP-MCNC: ABNORMAL MG/DL
RBC # UR STRIP: NEGATIVE /UL
SP GR UR: 1.02 (ref 1–1.03)
T4 FREE SERPL-MCNC: 1.35 NG/DL (ref 0.93–1.7)
TIBC SERPL-MCNC: 460 MCG/DL (ref 298–536)
TRANSFERRIN SERPL-MCNC: 309 MG/DL (ref 200–360)
TSH SERPL DL<=0.05 MIU/L-ACNC: 1.19 UIU/ML (ref 0.27–4.2)
UROBILINOGEN UR QL: NORMAL
VIT B12 BLD-MCNC: 487 PG/ML (ref 211–946)

## 2023-05-10 PROCEDURE — 1159F MED LIST DOCD IN RCRD: CPT | Performed by: STUDENT IN AN ORGANIZED HEALTH CARE EDUCATION/TRAINING PROGRAM

## 2023-05-10 PROCEDURE — 87591 N.GONORRHOEAE DNA AMP PROB: CPT | Performed by: STUDENT IN AN ORGANIZED HEALTH CARE EDUCATION/TRAINING PROGRAM

## 2023-05-10 PROCEDURE — 84443 ASSAY THYROID STIM HORMONE: CPT | Performed by: STUDENT IN AN ORGANIZED HEALTH CARE EDUCATION/TRAINING PROGRAM

## 2023-05-10 PROCEDURE — 86337 INSULIN ANTIBODIES: CPT | Performed by: STUDENT IN AN ORGANIZED HEALTH CARE EDUCATION/TRAINING PROGRAM

## 2023-05-10 PROCEDURE — 99213 OFFICE O/P EST LOW 20 MIN: CPT | Performed by: STUDENT IN AN ORGANIZED HEALTH CARE EDUCATION/TRAINING PROGRAM

## 2023-05-10 PROCEDURE — 84207 ASSAY OF VITAMIN B-6: CPT | Performed by: STUDENT IN AN ORGANIZED HEALTH CARE EDUCATION/TRAINING PROGRAM

## 2023-05-10 PROCEDURE — 82607 VITAMIN B-12: CPT | Performed by: STUDENT IN AN ORGANIZED HEALTH CARE EDUCATION/TRAINING PROGRAM

## 2023-05-10 PROCEDURE — 83036 HEMOGLOBIN GLYCOSYLATED A1C: CPT | Performed by: STUDENT IN AN ORGANIZED HEALTH CARE EDUCATION/TRAINING PROGRAM

## 2023-05-10 PROCEDURE — 87798 DETECT AGENT NOS DNA AMP: CPT | Performed by: STUDENT IN AN ORGANIZED HEALTH CARE EDUCATION/TRAINING PROGRAM

## 2023-05-10 PROCEDURE — 87661 TRICHOMONAS VAGINALIS AMPLIF: CPT | Performed by: STUDENT IN AN ORGANIZED HEALTH CARE EDUCATION/TRAINING PROGRAM

## 2023-05-10 PROCEDURE — 87801 DETECT AGNT MULT DNA AMPLI: CPT | Performed by: STUDENT IN AN ORGANIZED HEALTH CARE EDUCATION/TRAINING PROGRAM

## 2023-05-10 PROCEDURE — 3044F HG A1C LEVEL LT 7.0%: CPT | Performed by: STUDENT IN AN ORGANIZED HEALTH CARE EDUCATION/TRAINING PROGRAM

## 2023-05-10 PROCEDURE — 86341 ISLET CELL ANTIBODY: CPT | Performed by: STUDENT IN AN ORGANIZED HEALTH CARE EDUCATION/TRAINING PROGRAM

## 2023-05-10 PROCEDURE — 36415 COLL VENOUS BLD VENIPUNCTURE: CPT | Performed by: STUDENT IN AN ORGANIZED HEALTH CARE EDUCATION/TRAINING PROGRAM

## 2023-05-10 PROCEDURE — 87491 CHLMYD TRACH DNA AMP PROBE: CPT | Performed by: STUDENT IN AN ORGANIZED HEALTH CARE EDUCATION/TRAINING PROGRAM

## 2023-05-10 PROCEDURE — 81025 URINE PREGNANCY TEST: CPT | Performed by: STUDENT IN AN ORGANIZED HEALTH CARE EDUCATION/TRAINING PROGRAM

## 2023-05-10 PROCEDURE — 82306 VITAMIN D 25 HYDROXY: CPT | Performed by: STUDENT IN AN ORGANIZED HEALTH CARE EDUCATION/TRAINING PROGRAM

## 2023-05-10 PROCEDURE — 1160F RVW MEDS BY RX/DR IN RCRD: CPT | Performed by: STUDENT IN AN ORGANIZED HEALTH CARE EDUCATION/TRAINING PROGRAM

## 2023-05-10 PROCEDURE — 84439 ASSAY OF FREE THYROXINE: CPT | Performed by: STUDENT IN AN ORGANIZED HEALTH CARE EDUCATION/TRAINING PROGRAM

## 2023-05-10 PROCEDURE — 84466 ASSAY OF TRANSFERRIN: CPT | Performed by: STUDENT IN AN ORGANIZED HEALTH CARE EDUCATION/TRAINING PROGRAM

## 2023-05-10 PROCEDURE — 83540 ASSAY OF IRON: CPT | Performed by: STUDENT IN AN ORGANIZED HEALTH CARE EDUCATION/TRAINING PROGRAM

## 2023-05-10 PROCEDURE — 82746 ASSAY OF FOLIC ACID SERUM: CPT | Performed by: STUDENT IN AN ORGANIZED HEALTH CARE EDUCATION/TRAINING PROGRAM

## 2023-05-10 PROCEDURE — 82728 ASSAY OF FERRITIN: CPT | Performed by: STUDENT IN AN ORGANIZED HEALTH CARE EDUCATION/TRAINING PROGRAM

## 2023-05-10 NOTE — PROGRESS NOTES
Office Note     Name: Chasity Chamorro    : 1995     MRN: [id number]     Chief Complaint  Diabetes (Having concerns /), Urinary Frequency (No burning, doesn't believe uti ), Fatigue, Numbness (Rt hand mostly but has in both hands ), and Polydipsia    Subjective     History of Present Illness:  Chasity Chamorro is a 28 y.o. female who presents today for       Patient frequently urinates during the day and has increased thirst.  Having increased lethargy, and feel tired throughout the day.  Drinking throughout the day, waking up at night.  She wants to have an STD screening done. No abnormal vaginal discharge, no dysuria, no hematuria.          Review of Systems:   Review of Systems   All other systems reviewed and are negative.      Past Medical History:   Past Medical History:   Diagnosis Date   • Abnormal Pap smear of cervix 2019    LSIL   • Anxiety    • Bacterial vaginosis 2017   • Chlamydia     treated   • Depression    • Genital herpes    • Scoliosis     Diagnosed at age 12   • Urogenital trichomoniasis     treated       Past Surgical History:   Past Surgical History:   Procedure Laterality Date   • BREAST AUGMENTATION  2019   •  SECTION N/A 2017    Procedure:  SECTION PRIMARY;  Surgeon: Hakeem Retana MD;  Location: [facility] LABOR DELIVERY;  Service:    • TONSILLECTOMY     • WISDOM TOOTH EXTRACTION         Family History:   Family History   Problem Relation Age of Onset   • Bipolar disorder Mother    • No Known Problems Father    • Diabetes Maternal Grandfather    • Diabetes Maternal Aunt    • Breast cancer Neg Hx    • Ovarian cancer Neg Hx    • Colon cancer Neg Hx        Social History:   Social History     Socioeconomic History   • Marital status: Single   Tobacco Use   • Smoking status: Never   • Smokeless tobacco: Never   Vaping Use   • Vaping Use: Never used   Substance and Sexual Activity   • Alcohol use: No   • Drug use: Yes     Types: Marijuana   • Sexual  "activity: Yes     Partners: Male     Birth control/protection: None       Immunizations:   Immunization History   Administered Date(s) Administered   • Flu Vaccine Quad PF >36MO 09/22/2017   • Flu Vaccine Split Quad 09/22/2017   • HPV Quadrivalent 10/05/2007, 12/06/2007, 04/04/2008   • Influenza, Unspecified 09/01/2019        Medications:     Current Outpatient Medications:   •  albuterol sulfate  (90 Base) MCG/ACT inhaler, , Disp: , Rfl:   •  clindamycin (Cleocin) 300 MG capsule, Take 1 capsule by mouth 2 (Two) Times a Day for 7 days., Disp: 14 capsule, Rfl: 0  •  ferrous gluconate (FERGON) 324 MG tablet, Take 324 mg by mouth Daily With Breakfast., Disp: , Rfl:   •  fluconazole (Diflucan) 150 MG tablet, Take 1 tablet by mouth Daily As Needed (yeast infection)., Disp: 2 tablet, Rfl: 0  •  medroxyPROGESTERone (Depo-Provera) 150 MG/ML injection, Inject 1 mL into the appropriate muscle as directed by prescriber Every 3 (Three) Months., Disp: 1 each, Rfl: 0  •  valACYclovir (VALTREX) 500 MG tablet, Take 1 tablet by mouth 2 (Two) Times a Day., Disp: 14 tablet, Rfl: 2  •  vitamin D (ERGOCALCIFEROL) 1.25 MG (82081 UT) capsule capsule, Take 1 capsule by mouth Every 7 (Seven) Days for 8 doses., Disp: 8 capsule, Rfl: 0    Allergies:   Allergies   Allergen Reactions   • Lithium Swelling   • Nuts Anaphylaxis     \"I'm allergic to all tree nuts and all fruits, my throat swells and tongue itches\"   • Tree Nut Anaphylaxis     \"I'm allergic to all tree nuts and all fruits, my throat swells and tongue itches\"   • Fruit & Vegetable Daily [Nutritional Supplements] Swelling   • Penicillins Hives   • Latex Rash       Objective     Vital Signs  /62   Pulse 82   Temp 98.4 °F (36.9 °C) (Temporal)   Ht 158.8 cm (62.52\")   Wt 67.6 kg (149 lb)   SpO2 98%   BMI 26.80 kg/m²   Estimated body mass index is 26.8 kg/m² as calculated from the following:    Height as of this encounter: 158.8 cm (62.52\").    Weight as of this " encounter: 67.6 kg (149 lb).          Physical Exam  Constitutional:       Appearance: Normal appearance.   Cardiovascular:      Rate and Rhythm: Normal rate and regular rhythm.      Pulses: Normal pulses.      Heart sounds: Normal heart sounds.   Pulmonary:      Effort: Pulmonary effort is normal.      Breath sounds: Normal breath sounds.   Neurological:      Mental Status: She is alert.          Result Review :                  Assessment and Plan     1. Frequency of urination and polyuria    - POCT urinalysis dipstick, automated  - Hemoglobin A1c; Future  - POCT pregnancy, urine  - POC Glycosylated Hemoglobin (Hb A1C)    2. Other fatigue    - Ferritin; Future  - Vitamin D,25-Hydroxy; Future  - Vitamin B12; Future  - Vitamin B6; Future  - Folate; Future  - Iron and TIBC; Future  - Insulin Antibody; Future  - Glutamic Acid Decarboxylase; Future  - TSH; Future  - T4, free; Future    3. Screen for STD (sexually transmitted disease)    - Chlamydia trachomatis, Neisseria gonorrhoeae, PCR - Urine, Urethra; Future  - NuSwab VG+ - Swab, Vagina; Future        Follow Up  No follow-ups on file.    MD RUBÉN [facility] [address]  [facility]  [address]  [address] [id number] [phone]

## 2023-05-12 ENCOUNTER — TELEPHONE (OUTPATIENT)
Dept: INTERNAL MEDICINE | Facility: CLINIC | Age: 28
End: 2023-05-12

## 2023-05-12 LAB
C TRACH RRNA SPEC QL NAA+PROBE: NEGATIVE
N GONORRHOEA RRNA SPEC QL NAA+PROBE: NEGATIVE

## 2023-05-12 NOTE — TELEPHONE ENCOUNTER
Caller: Chasity Chamorro    Relationship: Self    Best call back number: [phone]    What is the best time to reach you: ANY    Who are you requesting to speak with (clinical staff, provider,  specific staff member): CLINCAL    What was the call regarding: PATIENT WOULD LIKE SOMEONE TO CALL HER AND GIVE HER AN EXPLANATION OF HER TEST [id number]    Do you require a callback: YES

## 2023-05-14 DIAGNOSIS — E55.9 VITAMIN D DEFICIENCY: ICD-10-CM

## 2023-05-14 DIAGNOSIS — B96.89 BACTERIAL VAGINOSIS: Primary | ICD-10-CM

## 2023-05-14 DIAGNOSIS — N76.0 BACTERIAL VAGINOSIS: Primary | ICD-10-CM

## 2023-05-14 LAB
A VAGINAE DNA VAG QL NAA+PROBE: ABNORMAL SCORE
BVAB2 DNA VAG QL NAA+PROBE: ABNORMAL SCORE
C ALBICANS DNA VAG QL NAA+PROBE: NEGATIVE
C GLABRATA DNA VAG QL NAA+PROBE: NEGATIVE
C TRACH DNA VAG QL NAA+PROBE: NEGATIVE
MEGA1 DNA VAG QL NAA+PROBE: ABNORMAL SCORE
N GONORRHOEA DNA VAG QL NAA+PROBE: NEGATIVE
T VAGINALIS DNA VAG QL NAA+PROBE: NEGATIVE

## 2023-05-14 RX ORDER — CLINDAMYCIN HYDROCHLORIDE 300 MG/1
300 CAPSULE ORAL 2 TIMES DAILY
Qty: 14 CAPSULE | Refills: 0 | Status: SHIPPED | OUTPATIENT
Start: 2023-05-14 | End: 2023-05-21

## 2023-05-14 RX ORDER — ERGOCALCIFEROL 1.25 MG/1
50000 CAPSULE ORAL
Qty: 8 CAPSULE | Refills: 0 | Status: SHIPPED | OUTPATIENT
Start: 2023-05-14 | End: 2023-07-03

## 2023-05-15 LAB — GAD65 AB SER IA-ACNC: <5 U/ML (ref 0–5)

## 2023-05-15 RX ORDER — FLUCONAZOLE 150 MG/1
150 TABLET ORAL DAILY PRN
Qty: 2 TABLET | Refills: 0 | Status: SHIPPED | OUTPATIENT
Start: 2023-05-15

## 2023-05-16 LAB — PYRIDOXAL PHOS SERPL-MCNC: 8.3 UG/L (ref 3.4–65.2)

## 2023-05-19 LAB — INSULIN AB SER-ACNC: <5 UU/ML

## 2023-11-02 ENCOUNTER — OFFICE VISIT (OUTPATIENT)
Dept: INTERNAL MEDICINE | Facility: CLINIC | Age: 28
End: 2023-11-02
Payer: COMMERCIAL

## 2023-11-02 ENCOUNTER — HOSPITAL ENCOUNTER (OUTPATIENT)
Dept: RADIOLOGY | Facility: CLINIC | Age: 28
Discharge: HOME | End: 2023-11-02
Payer: COMMERCIAL

## 2023-11-02 VITALS
TEMPERATURE: 98.2 F | OXYGEN SATURATION: 100 % | RESPIRATION RATE: 16 BRPM | DIASTOLIC BLOOD PRESSURE: 62 MMHG | HEART RATE: 87 BPM | SYSTOLIC BLOOD PRESSURE: 100 MMHG | WEIGHT: 136.6 LBS | HEIGHT: 63 IN | BODY MASS INDEX: 24.2 KG/M2

## 2023-11-02 DIAGNOSIS — Z30.013 ENCOUNTER FOR INITIAL PRESCRIPTION OF INJECTABLE CONTRACEPTIVE: ICD-10-CM

## 2023-11-02 DIAGNOSIS — N80.9 ENDOMETRIOSIS: ICD-10-CM

## 2023-11-02 DIAGNOSIS — M54.6 ACUTE LEFT-SIDED THORACIC BACK PAIN: ICD-10-CM

## 2023-11-02 DIAGNOSIS — F41.9 ANXIETY: Primary | ICD-10-CM

## 2023-11-02 PROCEDURE — 1160F RVW MEDS BY RX/DR IN RCRD: CPT

## 2023-11-02 PROCEDURE — 1159F MED LIST DOCD IN RCRD: CPT

## 2023-11-02 PROCEDURE — 99214 OFFICE O/P EST MOD 30 MIN: CPT

## 2023-11-02 RX ORDER — HYDROXYZINE HYDROCHLORIDE 25 MG/1
25 TABLET, FILM COATED ORAL 3 TIMES DAILY PRN
Qty: 30 TABLET | Refills: 0 | Status: SHIPPED | OUTPATIENT
Start: 2023-11-02

## 2023-11-02 RX ORDER — NAPROXEN 500 MG/1
500 TABLET ORAL 2 TIMES DAILY WITH MEALS
Qty: 60 TABLET | Refills: 0 | Status: SHIPPED | OUTPATIENT
Start: 2023-11-02

## 2023-11-02 RX ORDER — MEDROXYPROGESTERONE ACETATE 150 MG/ML
150 INJECTION, SUSPENSION INTRAMUSCULAR
Qty: 1 EACH | Refills: 0 | Status: SHIPPED | OUTPATIENT
Start: 2023-11-02 | End: 2023-11-03 | Stop reason: SDUPTHER

## 2023-11-02 RX ORDER — ESCITALOPRAM OXALATE 5 MG/1
5 TABLET ORAL DAILY
Qty: 30 TABLET | Refills: 0 | Status: SHIPPED | OUTPATIENT
Start: 2023-11-02

## 2023-11-02 RX ORDER — TIZANIDINE 2 MG/1
2 TABLET ORAL NIGHTLY PRN
Qty: 30 TABLET | Refills: 0 | Status: SHIPPED | OUTPATIENT
Start: 2023-11-02

## 2023-11-02 RX ORDER — HYDROXYZINE HYDROCHLORIDE 25 MG/1
25 TABLET, FILM COATED ORAL 3 TIMES DAILY PRN
COMMUNITY
End: 2023-11-02 | Stop reason: SDUPTHER
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
## 2023-11-03 DIAGNOSIS — Z30.013 ENCOUNTER FOR INITIAL PRESCRIPTION OF INJECTABLE CONTRACEPTIVE: ICD-10-CM

## 2023-11-03 DIAGNOSIS — N80.9 ENDOMETRIOSIS: ICD-10-CM

## 2023-11-06 RX ORDER — MEDROXYPROGESTERONE ACETATE 150 MG/ML
150 INJECTION, SUSPENSION INTRAMUSCULAR
Qty: 1 EACH | Refills: 0 | Status: SHIPPED | OUTPATIENT
Start: 2023-11-06

## 2023-11-06 NOTE — TELEPHONE ENCOUNTER
LV: 11/2/23  NV: 11/30/23  LF: 11/2/23 1/0  LPap: 8/11/21  Does she need to get this from Dr. Cope or gynecology?

## 2023-11-07 ENCOUNTER — CLINICAL SUPPORT (OUTPATIENT)
Dept: INTERNAL MEDICINE | Facility: CLINIC | Age: 28
End: 2023-11-07
Payer: COMMERCIAL

## 2023-11-07 DIAGNOSIS — Z30.013 ENCOUNTER FOR INITIAL PRESCRIPTION OF INJECTABLE CONTRACEPTIVE: Primary | ICD-10-CM

## 2023-11-07 RX ORDER — MEDROXYPROGESTERONE ACETATE 150 MG/ML
150 INJECTION, SUSPENSION INTRAMUSCULAR ONCE
Status: COMPLETED | OUTPATIENT
Start: 2023-11-07 | End: 2023-11-07

## 2023-11-07 RX ADMIN — MEDROXYPROGESTERONE ACETATE 150 MG: 150 INJECTION, SUSPENSION INTRAMUSCULAR at 10:27

## 2024-02-16 ENCOUNTER — TELEPHONE (OUTPATIENT)
Dept: INTERNAL MEDICINE | Facility: CLINIC | Age: 29
End: 2024-02-16
Payer: COMMERCIAL

## 2024-02-19 ENCOUNTER — OFFICE VISIT (OUTPATIENT)
Dept: INTERNAL MEDICINE | Facility: CLINIC | Age: 29
End: 2024-02-19
Payer: COMMERCIAL

## 2024-02-19 VITALS
WEIGHT: 149.2 LBS | BODY MASS INDEX: 26.44 KG/M2 | HEART RATE: 72 BPM | DIASTOLIC BLOOD PRESSURE: 60 MMHG | OXYGEN SATURATION: 96 % | TEMPERATURE: 96.9 F | SYSTOLIC BLOOD PRESSURE: 98 MMHG | HEIGHT: 63 IN

## 2024-02-19 DIAGNOSIS — N89.8 VAGINAL DISCHARGE: Primary | ICD-10-CM

## 2024-02-19 DIAGNOSIS — N89.8 VAGINAL ITCHING: ICD-10-CM

## 2024-02-19 LAB
BILIRUB BLD-MCNC: NEGATIVE MG/DL
CLARITY, POC: ABNORMAL
COLOR UR: YELLOW
EXPIRATION DATE: ABNORMAL
GLUCOSE UR STRIP-MCNC: NEGATIVE MG/DL
KETONES UR QL: NEGATIVE
LEUKOCYTE EST, POC: NEGATIVE
Lab: ABNORMAL
NITRITE UR-MCNC: NEGATIVE MG/ML
PH UR: 6.5 [PH] (ref 5–8)
PROT UR STRIP-MCNC: ABNORMAL MG/DL
RBC # UR STRIP: NEGATIVE /UL
SP GR UR: 1.01 (ref 1–1.03)
UROBILINOGEN UR QL: NORMAL

## 2024-02-19 PROCEDURE — 99213 OFFICE O/P EST LOW 20 MIN: CPT | Performed by: STUDENT IN AN ORGANIZED HEALTH CARE EDUCATION/TRAINING PROGRAM

## 2024-02-19 PROCEDURE — 87801 DETECT AGNT MULT DNA AMPLI: CPT | Performed by: STUDENT IN AN ORGANIZED HEALTH CARE EDUCATION/TRAINING PROGRAM

## 2024-02-19 PROCEDURE — 87661 TRICHOMONAS VAGINALIS AMPLIF: CPT | Performed by: STUDENT IN AN ORGANIZED HEALTH CARE EDUCATION/TRAINING PROGRAM

## 2024-02-19 PROCEDURE — 1159F MED LIST DOCD IN RCRD: CPT | Performed by: STUDENT IN AN ORGANIZED HEALTH CARE EDUCATION/TRAINING PROGRAM

## 2024-02-19 PROCEDURE — 1160F RVW MEDS BY RX/DR IN RCRD: CPT | Performed by: STUDENT IN AN ORGANIZED HEALTH CARE EDUCATION/TRAINING PROGRAM

## 2024-02-19 PROCEDURE — 87491 CHLMYD TRACH DNA AMP PROBE: CPT | Performed by: STUDENT IN AN ORGANIZED HEALTH CARE EDUCATION/TRAINING PROGRAM

## 2024-02-19 PROCEDURE — 87798 DETECT AGENT NOS DNA AMP: CPT | Performed by: STUDENT IN AN ORGANIZED HEALTH CARE EDUCATION/TRAINING PROGRAM

## 2024-02-19 PROCEDURE — 87591 N.GONORRHOEAE DNA AMP PROB: CPT | Performed by: STUDENT IN AN ORGANIZED HEALTH CARE EDUCATION/TRAINING PROGRAM

## 2024-02-19 RX ORDER — FLUCONAZOLE 150 MG/1
TABLET ORAL
Qty: 2 TABLET | Refills: 0 | Status: SHIPPED | OUTPATIENT
Start: 2024-02-19

## 2024-02-19 NOTE — PROGRESS NOTES
Office Note     Name: Chasity Chamorro    : 1995     MRN: [id number]     Chief Complaint  Vaginal Discharge (Thick white discharge /) and Vaginal Itching    Subjective     History of Present Illness:  Chasity Chamorro is a 28 y.o. female who presents today for     abnormal vaginal discharge. Symptoms have been present for 1 days. Vaginal symptoms: discharge described as white, odorless, and frothy. Contraception: Depo-Provera injections. She denies abnormal bleeding, blisters, bumps, burning, and lesions. Sexually transmitted infection risk:         Review of Systems:   Review of Systems   All other systems reviewed and are negative.      Past Medical History:   Past Medical History:   Diagnosis Date    Abnormal Pap smear of cervix 2019    LSIL    Anxiety     Bacterial vaginosis 2017    Chlamydia     treated    Depression     Genital herpes     Scoliosis     Diagnosed at age 12    Urogenital trichomoniasis 2015    treated       Past Surgical History:   Past Surgical History:   Procedure Laterality Date    BREAST AUGMENTATION  2019     SECTION N/A 2017    Procedure:  SECTION PRIMARY;  Surgeon: Hakeem Retana MD;  Location: [facility] LABOR DELIVERY;  Service:     TONSILLECTOMY      WISDOM TOOTH EXTRACTION         Family History:   Family History   Problem Relation Age of Onset    Bipolar disorder Mother     No Known Problems Father     Diabetes Maternal Grandfather     Diabetes Maternal Aunt     Breast cancer Neg Hx     Ovarian cancer Neg Hx     Colon cancer Neg Hx        Social History:   Social History     Socioeconomic History    Marital status: Single   Tobacco Use    Smoking status: Never    Smokeless tobacco: Never   Vaping Use    Vaping Use: Never used   Substance and Sexual Activity    Alcohol use: No    Drug use: Not Currently     Types: Marijuana    Sexual activity: Yes     Partners: Male     Birth control/protection: None       Immunizations:   Immunization History  "  Administered Date(s) Administered    COVID-19 (MODERNA) 1st,2nd,3rd Dose Monovalent 06/03/2021, 07/28/2021, 01/14/2022    Flu Vaccine Quad PF >36MO 09/22/2017    Flu Vaccine Split Quad 09/22/2017    Fluzone (or Fluarix & Flulaval for VFC) >6mos 09/22/2017, 10/11/2022    HPV Quadrivalent 10/05/2007, 12/06/2007, 04/04/2008    Influenza, Unspecified 09/01/2019    Tdap 10/11/2022        Medications:     Current Outpatient Medications:     escitalopram (Lexapro) 5 MG tablet, Take 1 tablet by mouth Daily. (Patient not taking: Reported on 2/19/2024), Disp: 30 tablet, Rfl: 0    fluconazole (Diflucan) 150 MG tablet, Please take 1 tablet by mouth for itching, if no improvement within 24-48 hours, take the 2nd tablet by mouth., Disp: 2 tablet, Rfl: 0    hydrOXYzine (ATARAX) 25 MG tablet, Take 1 tablet by mouth 3 (Three) Times a Day As Needed for Itching. (Patient not taking: Reported on 2/19/2024), Disp: 30 tablet, Rfl: 0    medroxyPROGESTERone (Depo-Provera) 150 MG/ML injection, Inject 1 mL into the appropriate muscle as directed by prescriber Every 3 (Three) Months. (Patient not taking: Reported on 2/19/2024), Disp: 1 each, Rfl: 0    naproxen (Naprosyn) 500 MG tablet, Take 1 tablet by mouth 2 (Two) Times a Day With Meals. (Patient not taking: Reported on 2/19/2024), Disp: 60 tablet, Rfl: 0    tiZANidine (ZANAFLEX) 2 MG tablet, Take 1 tablet by mouth At Night As Needed for Muscle Spasms. (Patient not taking: Reported on 2/19/2024), Disp: 30 tablet, Rfl: 0    Allergies:   Allergies   Allergen Reactions    Lithium Swelling    Nuts Anaphylaxis     \"I'm allergic to all tree nuts and all fruits, my throat swells and tongue itches\"    Tree Nut Anaphylaxis     \"I'm allergic to all tree nuts and all fruits, my throat swells and tongue itches\"    Fruit & Vegetable Daily [Nutritional Supplements] Swelling    Penicillins Hives    Latex Rash       Objective     Vital Signs  BP 98/60   Pulse 72   Temp 96.9 °F (36.1 °C) (Temporal)   " "Ht 158.8 cm (62.52\")   Wt 67.7 kg (149 lb 3.2 oz)   SpO2 96%   BMI 26.84 kg/m²   Estimated body mass index is 26.84 kg/m² as calculated from the following:    Height as of this encounter: 158.8 cm (62.52\").    Weight as of this encounter: 67.7 kg (149 lb 3.2 oz).            Physical Exam  Constitutional:       Appearance: Normal appearance.   Cardiovascular:      Rate and Rhythm: Normal rate and regular rhythm.      Pulses: Normal pulses.      Heart sounds: Normal heart sounds.   Pulmonary:      Effort: Pulmonary effort is normal.      Breath sounds: Normal breath sounds.   Neurological:      Mental Status: She is alert.          Result Review :                  Assessment and Plan     1. Vaginal discharge    - NuSwab VG+ - Swab, Vagina; Future  - Chlamydia trachomatis, Neisseria gonorrhoeae, PCR - Urine, Urine, Clean Catch; Future  - POC Urinalysis Dipstick, Automated  - NuSwab VG+ - Swab, Vagina  - Chlamydia trachomatis, Neisseria gonorrhoeae, PCR - Urine, Urine, Clean Catch    2. Vaginal itching    - fluconazole (Diflucan) 150 MG tablet; Please take 1 tablet by mouth for itching, if no improvement within 24-48 hours, take the 2nd tablet by mouth.  Dispense: 2 tablet; Refill: 0       Follow Up  Follow up as needed or if symptoms worsen  No follow-ups on file.    Ovidio Cope MD  [facility]  [facility]  [address] [facility]  [address] [id number] [phone]    "

## 2024-02-21 LAB
A VAGINAE DNA VAG QL NAA+PROBE: NORMAL SCORE
BVAB2 DNA VAG QL NAA+PROBE: NORMAL SCORE
C ALBICANS DNA VAG QL NAA+PROBE: NEGATIVE
C GLABRATA DNA VAG QL NAA+PROBE: NEGATIVE
C TRACH DNA VAG QL NAA+PROBE: NEGATIVE
C TRACH RRNA SPEC QL NAA+PROBE: NEGATIVE
MEGA1 DNA VAG QL NAA+PROBE: NORMAL SCORE
N GONORRHOEA DNA VAG QL NAA+PROBE: NEGATIVE
N GONORRHOEA RRNA SPEC QL NAA+PROBE: NEGATIVE
T VAGINALIS DNA VAG QL NAA+PROBE: NEGATIVE

## 2024-05-16 ENCOUNTER — OFFICE VISIT (OUTPATIENT)
Dept: INTERNAL MEDICINE | Facility: CLINIC | Age: 29
End: 2024-05-16
Payer: COMMERCIAL

## 2024-05-16 VITALS
OXYGEN SATURATION: 100 % | DIASTOLIC BLOOD PRESSURE: 60 MMHG | RESPIRATION RATE: 16 BRPM | TEMPERATURE: 99.3 F | HEART RATE: 84 BPM | HEIGHT: 63 IN | SYSTOLIC BLOOD PRESSURE: 104 MMHG | WEIGHT: 149.2 LBS | BODY MASS INDEX: 26.44 KG/M2

## 2024-05-16 DIAGNOSIS — E86.0 DEHYDRATION: Primary | ICD-10-CM

## 2024-05-16 DIAGNOSIS — R11.2 NAUSEA AND VOMITING, UNSPECIFIED VOMITING TYPE: ICD-10-CM

## 2024-05-16 LAB
B-HCG UR QL: NEGATIVE
BILIRUB BLD-MCNC: NEGATIVE MG/DL
CLARITY, POC: CLEAR
COLOR UR: YELLOW
EXPIRATION DATE: ABNORMAL
EXPIRATION DATE: NORMAL
GLUCOSE UR STRIP-MCNC: NEGATIVE MG/DL
INTERNAL NEGATIVE CONTROL: NEGATIVE
INTERNAL POSITIVE CONTROL: POSITIVE
KETONES UR QL: NEGATIVE
LEUKOCYTE EST, POC: NEGATIVE
Lab: ABNORMAL
Lab: NORMAL
NITRITE UR-MCNC: NEGATIVE MG/ML
PH UR: 8 [PH] (ref 5–8)
PROT UR STRIP-MCNC: ABNORMAL MG/DL
RBC # UR STRIP: NEGATIVE /UL
SP GR UR: 1.01 (ref 1–1.03)
UROBILINOGEN UR QL: NORMAL

## 2024-05-16 PROCEDURE — 81025 URINE PREGNANCY TEST: CPT | Performed by: NURSE PRACTITIONER

## 2024-05-16 PROCEDURE — 81003 URINALYSIS AUTO W/O SCOPE: CPT | Performed by: NURSE PRACTITIONER

## 2024-05-16 PROCEDURE — 1126F AMNT PAIN NOTED NONE PRSNT: CPT | Performed by: NURSE PRACTITIONER

## 2024-05-16 PROCEDURE — 1160F RVW MEDS BY RX/DR IN RCRD: CPT | Performed by: NURSE PRACTITIONER

## 2024-05-16 PROCEDURE — 1159F MED LIST DOCD IN RCRD: CPT | Performed by: NURSE PRACTITIONER

## 2024-05-16 PROCEDURE — 99213 OFFICE O/P EST LOW 20 MIN: CPT | Performed by: NURSE PRACTITIONER

## 2024-05-16 RX ORDER — PROMETHAZINE HYDROCHLORIDE 25 MG/1
25 TABLET ORAL
COMMUNITY
Start: 2024-05-14 | End: 2024-05-17

## 2024-05-16 NOTE — PROGRESS NOTES
Subjective   Chasity Chamorro is a 29 y.o. female    Chief Complaint   Patient presents with    Anxiety    Vomiting     Patient has been vomitting she states it is due to anxiety. patient states she was supposed to get a scope down stomach and was not able to.    Dehydration     History of Present Illness     Pt states that she traveled to [facility] from 5/9/2024 - 5/13/2024 and she becomes very anxious when traveling.  She started feeling ill around 5/12/2024 with N/V, and this has continued.  States that she is throwing up more than 5 times her day.  She has been dry-heaving for the past few days.  The only thing that she has had to drink today is one glass of water with her meds.  She did take some phenergan at 1430 today, but it is no longer helping.  She actually vomited while in the exam room today.      During our interview, the pt asked if she should just go to the ER and I agreed that this was probably best.  She left quickly, stating that her GF would drive her there now.      She was in the ER on 5/14/2024 at  with similar sx's.      The following portions of the patient's history were reviewed and updated as appropriate: allergies, current medications, past family history, past medical history, past social history, past surgical history, and problem list.    Current Outpatient Medications:     promethazine (PHENERGAN) 25 MG tablet, Take 1 tablet by mouth., Disp: , Rfl:     escitalopram (Lexapro) 5 MG tablet, Take 1 tablet by mouth Daily. (Patient not taking: Reported on 2/19/2024), Disp: 30 tablet, Rfl: 0    fluconazole (Diflucan) 150 MG tablet, Please take 1 tablet by mouth for itching, if no improvement within 24-48 hours, take the 2nd tablet by mouth. (Patient not taking: Reported on 5/16/2024), Disp: 2 tablet, Rfl: 0    hydrOXYzine (ATARAX) 25 MG tablet, Take 1 tablet by mouth 3 (Three) Times a Day As Needed for Itching. (Patient not taking: Reported on 2/19/2024), Disp: 30 tablet, Rfl: 0     medroxyPROGESTERone (Depo-Provera) 150 MG/ML injection, Inject 1 mL into the appropriate muscle as directed by prescriber Every 3 (Three) Months. (Patient not taking: Reported on 2/19/2024), Disp: 1 each, Rfl: 0    naproxen (Naprosyn) 500 MG tablet, Take 1 tablet by mouth 2 (Two) Times a Day With Meals. (Patient not taking: Reported on 2/19/2024), Disp: 60 tablet, Rfl: 0    tiZANidine (ZANAFLEX) 2 MG tablet, Take 1 tablet by mouth At Night As Needed for Muscle Spasms. (Patient not taking: Reported on 2/19/2024), Disp: 30 tablet, Rfl: 0     Review of Systems   Constitutional:  Positive for fatigue. Negative for chills and fever.   Respiratory:  Negative for cough, chest tightness and shortness of breath.    Cardiovascular:  Negative for chest pain.   Gastrointestinal:  Positive for abdominal pain, nausea and vomiting. Negative for diarrhea.   Endocrine: Negative for cold intolerance and heat intolerance.   Musculoskeletal:  Negative for arthralgias.   Neurological:  Negative for dizziness.   Psychiatric/Behavioral:  The patient is nervous/anxious.        Objective   Physical Exam  Constitutional:       Appearance: She is well-developed. She is ill-appearing.   HENT:      Head: Normocephalic and atraumatic.   Eyes:      Conjunctiva/sclera: Conjunctivae normal.      Pupils: Pupils are equal, round, and reactive to light.   Cardiovascular:      Rate and Rhythm: Normal rate and regular rhythm.      Heart sounds: Normal heart sounds.   Pulmonary:      Effort: Pulmonary effort is normal.      Breath sounds: Normal breath sounds.   Abdominal:      General: Bowel sounds are normal.      Palpations: Abdomen is soft.   Musculoskeletal:         General: Normal range of motion.      Cervical back: Normal range of motion.   Skin:     General: Skin is warm and dry.   Neurological:      Mental Status: She is alert and oriented to person, place, and time.      Deep Tendon Reflexes: Reflexes are normal and symmetric.   Psychiatric:    "      Behavior: Behavior normal.         Thought Content: Thought content normal.         Judgment: Judgment normal.       Vitals:    05/16/24 1535   BP: 104/60   Pulse: 84   Resp: 16   Temp: 99.3 °F (37.4 °C)   TempSrc: Temporal   SpO2: 100%   Weight: 67.7 kg (149 lb 3.2 oz)   Height: 158.8 cm (62.52\")         Assessment & Plan   Diagnoses and all orders for this visit:    1. Dehydration (Primary)  -     POCT urinalysis dipstick, automated    2. Nausea and vomiting, unspecified vomiting type  -     POCT pregnancy, urine      Urine pregnancy negative  Urinalysis within acceptable limits  Patient left during interview, stating she would proceed to the ER.               "

## 2024-07-26 ENCOUNTER — TELEPHONE (OUTPATIENT)
Dept: OBSTETRICS AND GYNECOLOGY | Facility: CLINIC | Age: 29
End: 2024-07-26

## 2024-07-26 NOTE — TELEPHONE ENCOUNTER
PROVIDER: CLARE JARAMILLO    CALLER: LENY PEARSON    PHONE NUMBER:[phone]    REASON FOR CALL: SAME DAY CANCELLATION//NOT ABLE TO MAKE IT//RESCHEDULED

## 2024-12-09 ENCOUNTER — OFFICE VISIT (OUTPATIENT)
Dept: OBSTETRICS AND GYNECOLOGY | Facility: CLINIC | Age: 29
End: 2024-12-09
Payer: COMMERCIAL

## 2024-12-09 VITALS — DIASTOLIC BLOOD PRESSURE: 70 MMHG | BODY MASS INDEX: 28.42 KG/M2 | SYSTOLIC BLOOD PRESSURE: 116 MMHG | WEIGHT: 158 LBS

## 2024-12-09 DIAGNOSIS — G43.009 MIGRAINE WITHOUT AURA AND WITHOUT STATUS MIGRAINOSUS, NOT INTRACTABLE: ICD-10-CM

## 2024-12-09 DIAGNOSIS — Z01.419 WOMEN'S ANNUAL ROUTINE GYNECOLOGICAL EXAMINATION: Primary | ICD-10-CM

## 2024-12-09 DIAGNOSIS — Z12.4 SCREENING FOR CERVICAL CANCER: ICD-10-CM

## 2024-12-09 DIAGNOSIS — Z01.419 PAP TEST, AS PART OF ROUTINE GYNECOLOGICAL EXAMINATION: ICD-10-CM

## 2024-12-09 DIAGNOSIS — B00.9 HSV-2 INFECTION: ICD-10-CM

## 2024-12-09 DIAGNOSIS — N64.52 BREAST DISCHARGE: ICD-10-CM

## 2024-12-09 DIAGNOSIS — R87.612 LGSIL ON PAP SMEAR OF CERVIX: ICD-10-CM

## 2024-12-09 DIAGNOSIS — N80.9 ENDOMETRIOSIS: ICD-10-CM

## 2024-12-09 DIAGNOSIS — N92.6 MENSTRUAL CYCLE PROBLEM: ICD-10-CM

## 2024-12-09 DIAGNOSIS — N64.4 BREAST PAIN: ICD-10-CM

## 2024-12-09 DIAGNOSIS — Z11.3 SCREEN FOR STD (SEXUALLY TRANSMITTED DISEASE): ICD-10-CM

## 2024-12-09 RX ORDER — VALACYCLOVIR HYDROCHLORIDE 500 MG/1
500 TABLET, FILM COATED ORAL DAILY
Qty: 90 TABLET | Refills: 4 | Status: SHIPPED | OUTPATIENT
Start: 2024-12-09

## 2024-12-09 RX ORDER — SUMATRIPTAN 50 MG/1
50 TABLET, FILM COATED ORAL ONCE AS NEEDED
COMMUNITY
Start: 2024-10-16 | End: 2024-12-09 | Stop reason: SDUPTHER

## 2024-12-09 RX ORDER — SUMATRIPTAN 50 MG/1
50 TABLET, FILM COATED ORAL ONCE AS NEEDED
Qty: 12 TABLET | Refills: 1 | Status: SHIPPED | OUTPATIENT
Start: 2024-12-09

## 2024-12-09 RX ORDER — NORETHINDRONE 5 MG/1
5 TABLET ORAL DAILY
Qty: 30 TABLET | Refills: 5 | Status: SHIPPED | OUTPATIENT
Start: 2024-12-09

## 2024-12-09 RX ORDER — BACLOFEN 20 MG
1 TABLET ORAL DAILY
COMMUNITY
Start: 2024-10-17

## 2024-12-09 NOTE — PROGRESS NOTES
Gynecologic Annual Exam Note        Gynecologic Exam and Establish Care        Subjective     HPI  Chasity Chamorro is a 29 y.o.  female who presents for annual well woman exam as a new patient. There were no changes to her medical or surgical history since her last visit. Patient's last menstrual period was 2024 (exact date). Her periods occur every 28-30 days, lasting 7 days.  The flow is heavy with clotting. She reports dysmenorrhea is severe occurring premenstrually and throughout menses. Marital Status: single.  She is sexually active. She has not had new partners. STD testing recommendations have been explained to the patient and she does desire STD testing.    The patient would like to discuss the following complaints today: Endometriosis pain. She reports she had surgery for  scar revision 2023 @ [facility] (Attending surgeon: Riya Millard MD and Assistant Marva Rivera MD- Resident). Patient states they removed tissue to determine if it was endometriosis. Patient reports symptoms became slightly better after procedures (Mini-laparotomy with endometriosis implant excision. Exam under anesthesia with pap smear and IUD insertion). Symptoms are starting to get bad again. She reports at the beginning of the year for 6 months she did not have a period but thought it could be due to Depo.Her last depo injection was between 2024. In May/2024 her cycles resumed. She reports her periods are regular now but becoming heavier, more painful, with large clots with the past 3-4 cycles.     She was previously treat with an IUD and Nuva ring but reports devices caused recurrent vaginitis symptoms. She states previous OB provider Riya Millard MD @  wanted her to start taking Aygestin but she has not started taking.     Patient is also having breast issues, she does have implants (5 years now) she reports she has intermittent lumps/masses that are painful. Patient reports she can  still express milk from her breast, she has not breast fed in 4 years. Admits to drinking 1 cup of coffee maybe three times per week and she admits to drinking a lot of sweet tea. She states she loves sweet tea. She denies concern for pregnancy due to female partner.     Additional OB/GYN History   contraceptive methods: Female partner, No Contraceptive Needed  Desires to: do not start contraception  Thromboembolic Disease: none  History of migraines: yes without aura  History of STD: yes GC/CHLAMYDIA    Last Pap : 23. Results: negative. HPV: unknown .   Last Completed Pap Smear            Ordered - PAP SMEAR (Every 3 Years) Ordered on 2023  Done - negative; performed at     2021  Liquid-based Pap Smear, Screening    2021  Liquid-based Pap Smear, Screening    2019  SCANNED - PAP SMEAR    2019  SCANNED - PAP SMEAR    Only the first 5 history entries have been loaded, but more history exists.                     History of abnormal Pap smear: yes - repeat neg per patient.   Gardasil status:completed  Family history of uterine, colon, breast, or ovarian cancer: no  Performs monthly Self-Breast Exam: yes  Exercises Regularly:no  Feelings of Anxiety or Depression: yes - both- declines treatment  Tobacco Usage?: No       Current Outpatient Medications:     Magnesium Oxide -Mg Supplement 500 MG tablet, Take 1 tablet by mouth Daily., Disp: , Rfl:     SUMAtriptan (IMITREX) 50 MG tablet, Take 1 tablet by mouth 1 (One) Time As Needed for Migraine., Disp: 12 tablet, Rfl: 1    norethindrone (Aygestin) 5 MG tablet, Take 1 tablet by mouth Daily., Disp: 30 tablet, Rfl: 5    valACYclovir (Valtrex) 500 MG tablet, Take 1 tablet by mouth Daily., Disp: 90 tablet, Rfl: 4     Patient denies the need for medication refills today.    OB History          4    Para   1    Term   1       0    AB   3    Living   1         SAB   2    IAB   1    Ectopic   0    Molar         Multiple        Live Births   1          Obstetric Comments   History of HSV  Remote history of chlamydia, trichomonas     Denies history of abnormal paps               Health Maintenance   Topic Date Due    MOST FORM  Never done    ANNUAL PHYSICAL  2022    Annual Gynecologic Pelvic and Breast Exam  2022    INFLUENZA VACCINE  2024    COVID-19 Vaccine ( - - season) 2024    BMI FOLLOWUP  2024    PAP SMEAR  2026    TDAP/TD VACCINES (2 - Td or Tdap) 10/11/2032    HEPATITIS C SCREENING  Completed    Pneumococcal Vaccine 0-64  Aged Out       Past Medical History:   Diagnosis Date    Anxiety     Bacterial vaginosis 2017    Cyclic vomiting syndrome     Depression     Endometriosis     History of abnormal cervical Pap smear     2019: LSIL    History of chlamydia     treated    History of trauma     History of trichomoniasis     treated    HSV infection 2016    Migraines     Scoliosis     Diagnosed at age 12        Past Surgical History:   Procedure Laterality Date    BREAST AUGMENTATION  2019     SECTION N/A 2017    Procedure:  SECTION PRIMARY;  Surgeon: Hakeem Retana MD;  Location: [facility] LABOR DELIVERY;  Service:     LIPOSUCTION  10/2020    MYRINGOTOMY W/ TUBES Bilateral     SCAR REVISION  2023    1cm x 3cm nodule at  scar excised; pathology of endometriosis    TONSILLECTOMY AND ADENOIDECTOMY      TRIGGER POINT INJECTION  2022    abdominal wall for  scar pain    WISDOM TOOTH EXTRACTION         The additional following portions of the patient's history were reviewed and updated as appropriate: allergies, current medications, past family history, past medical history, past social history, and past surgical history.    Review of Systems   Respiratory: Negative.  Negative for shortness of breath.    Cardiovascular: Negative.  Negative for chest pain.   Gastrointestinal: Negative.  Negative for abdominal  distention, abdominal pain and constipation.   Genitourinary:  Positive for breast discharge, breast pain, menstrual problem and pelvic pain. Negative for breast lump, dyspareunia, dysuria, pelvic pressure, urinary incontinence, vaginal bleeding, vaginal discharge and vaginal pain.   Neurological:  Positive for headache (Managed by neurologist.).         I have reviewed and agree with the HPI, ROS, and historical information as entered above. Neva Rod, APRN          Objective   /70   Wt 71.7 kg (158 lb)   LMP 12/06/2024 (Exact Date)   BMI 28.42 kg/m²     Physical Exam  Vitals and nursing note reviewed. Exam conducted with a chaperone present.   Constitutional:       General: She is not in acute distress.     Appearance: Normal appearance. She is well-developed. She is not ill-appearing or toxic-appearing.   HENT:      Head: Normocephalic and atraumatic.   Pulmonary:      Effort: Pulmonary effort is normal. No retractions.   Chest:      Chest wall: No mass, deformity or tenderness.   Breasts:     Breasts are symmetrical.      Right: Normal. No swelling, bleeding, inverted nipple, mass, nipple discharge, skin change or tenderness.      Left: Normal. No swelling, bleeding, inverted nipple, mass, nipple discharge, skin change or tenderness.      Comments: Bilateral breast implants     Abdominal:      Palpations: Abdomen is soft. Abdomen is not rigid. There is no mass.      Tenderness: There is no abdominal tenderness. There is no guarding or rebound.      Hernia: No hernia is present. There is no hernia in the left inguinal area or right inguinal area.   Genitourinary:     General: Normal vulva.      Labia:         Right: No rash, tenderness or lesion.         Left: No rash, tenderness or lesion.       Vagina: Normal. No vaginal discharge, erythema, tenderness, bleeding or lesions.      Cervix: No cervical motion tenderness, discharge, friability, lesion, erythema or cervical bleeding.      Uterus:  Normal. Not enlarged, not fixed and not tender.       Adnexa: Right adnexa normal and left adnexa normal.        Right: No mass or tenderness.          Left: No mass or tenderness.        Rectum: No external hemorrhoid.   Lymphadenopathy:      Upper Body:      Right upper body: No supraclavicular or axillary adenopathy.      Left upper body: No supraclavicular or axillary adenopathy.   Neurological:      Mental Status: She is alert and oriented to person, place, and time.   Psychiatric:         Mood and Affect: Mood normal.         Behavior: Behavior normal.            Assessment and Plan    Problem List Items Addressed This Visit       Endometriosis    Overview     Tried Depo, Nuva ring, OCPs.   2023-  scar endometriosis s/p resection with simultaneous Mirena placement.   2023- Mirena removed due to recurrent vaginitis.   2024- Begin Aygestin 5mg QD.           Relevant Medications    norethindrone (Aygestin) 5 MG tablet    LGSIL on Pap smear of cervix    Overview     2019  [ ] repeat cytology @ 12 months          Menstrual cycle problem    Overview     2024- Heavy & painful. Proceed with TVUS.          Relevant Medications    norethindrone (Aygestin) 5 MG tablet    Other Relevant Orders    US Non-ob Transvaginal     Other Visit Diagnoses       Women's annual routine gynecological examination    -  Primary    Relevant Medications    SUMAtriptan (IMITREX) 50 MG tablet    Other Relevant Orders    LIQUID-BASED PAP SMEAR WITH HPV GENOTYPING REGARDLESS OF INTERPRETATION (NALLELY,COR,MAD)    RPR, Rfx Qn RPR / Confirm TP    Hepatitis B Surface Antigen    Hepatitis C Antibody    HIV-1 / O / 2 Ag / Antibody 4th Generation    Pap test, as part of routine gynecological examination        Relevant Orders    LIQUID-BASED PAP SMEAR WITH HPV GENOTYPING REGARDLESS OF INTERPRETATION (NALLELY,COR,MAD)    Screening for cervical cancer        Relevant Orders    LIQUID-BASED PAP SMEAR WITH HPV GENOTYPING REGARDLESS OF  INTERPRETATION (NALLELY,COR,MAD)    Screen for STD (sexually transmitted disease)        Relevant Medications    SUMAtriptan (IMITREX) 50 MG tablet    Other Relevant Orders    LIQUID-BASED PAP SMEAR WITH HPV GENOTYPING REGARDLESS OF INTERPRETATION (NALLELY,COR,MAD)    RPR, Rfx Qn RPR / Confirm TP    Hepatitis B Surface Antigen    Hepatitis C Antibody    HIV-1 / O / 2 Ag / Antibody 4th Generation    Breast pain        Relevant Orders    Mammo Diagnostic Digital Tomosynthesis Bilateral With CAD    Migraine without aura and without status migrainosus, not intractable        Relevant Medications    SUMAtriptan (IMITREX) 50 MG tablet    HSV-2 infection        Relevant Medications    valACYclovir (Valtrex) 500 MG tablet    Breast discharge        Relevant Orders    Prolactin    Mammo Diagnostic Digital Tomosynthesis Bilateral With CAD            GYN annual well woman exam.   Reviewed pap guidelines. Pap performed with STI testing. STD blood panel pending. Will treat if indicated.   Hx of HSV- Valtrex QD prescribed. Patient informed ok to take PRN outbreaks if desires.   Encouraged use of condoms for STD prevention.  Migraines- Continue to f/u with neurology for management. She requested a RF on Imitrex until next urology appt. Ordered.   Reviewed monthly self breast exams.  Instructed to call with lumps, pain, or breast discharge.  Breast discharge- Milk consistency. Patient states only with nipple/breast stimulation. Intermittent breast lumps and tenderness. Potential etiologies reviewed. She admits to caffeine intake. Reassurance provided, condition likely benign. Prolactin ordered. Dx mammogram ordered to evaluate further. 6/27/2024 (performed after d/c began)- TSH/Free T4 WNL. Fibrocystic breast changes - Encouraged decreasing caffeine, supportive bra, low dose vitamin E supplementation.   Endometriosis discussed in detail. Recommended conservative management to preserve fertility and improve cycle related symptoms. Treatment  options reviewed. She will begin Aygestin 5mg QD. Aygestin benefits, risks, and se reviewed. She has been informed this is not a form of contraception. If symptoms persist or worsen, will consider other conservative treatment options for endometriosis (Orilissa, Danocrine, Myfembree). If symptoms fail to improve with conservative options, she will be scheduled with MD to discuss surgical options. US ordered to r/o other structural abnormalities.   Reviewed exercise as a preventative health measures.   Reccommended Flu Vaccine in Fall of each year.  Myfembree, Danocrine, Orilissa, Endometriosis, Fibrocystic breast change, and Aygestin education included in patient instructions.   Return for Ultrasound- painful/heavy cycle.    Neva Rod, APRN  12/09/2024

## 2024-12-10 LAB
HBV SURFACE AG SERPL QL IA: NEGATIVE
HCV IGG SERPL QL IA: NON REACTIVE
HIV 1+2 AB+HIV1 P24 AG SERPL QL IA: NON REACTIVE
PROLACTIN SERPL-MCNC: 7.3 NG/ML (ref 4.8–33.4)
RPR SER QL: NON REACTIVE

## 2024-12-16 LAB — REF LAB TEST METHOD: NORMAL

## 2024-12-17 DIAGNOSIS — N64.4 BREAST PAIN: ICD-10-CM

## 2024-12-17 DIAGNOSIS — N64.52 BREAST DISCHARGE: Primary | ICD-10-CM

## 2025-01-20 ENCOUNTER — OFFICE VISIT (OUTPATIENT)
Dept: OBSTETRICS AND GYNECOLOGY | Facility: CLINIC | Age: 30
End: 2025-01-20
Payer: COMMERCIAL

## 2025-01-20 VITALS — WEIGHT: 160 LBS | DIASTOLIC BLOOD PRESSURE: 76 MMHG | SYSTOLIC BLOOD PRESSURE: 114 MMHG | BODY MASS INDEX: 28.78 KG/M2

## 2025-01-20 DIAGNOSIS — N92.6 MENSTRUAL CYCLE PROBLEM: ICD-10-CM

## 2025-01-20 DIAGNOSIS — N80.9 ENDOMETRIOSIS: Primary | ICD-10-CM

## 2025-01-20 NOTE — PROGRESS NOTES
Chief Complaint   Patient presents with    Menorrhagia    Dysmenorrhea         Subjective   HPI  Chasity Chamorro is a 29 y.o. female, ,Patient's last menstrual period was 2025 (exact date)..  She presents for a follow up evaluation of Menorrhagia and Dysmenorrhea. The patient was last seen  1 month ago by CLARE Green. At that time she reported Endometriosis pain. She reported she had surgery for  scar revision 2023 @  (Attending surgeon: Riya Millard MD and Assistant Marva Rivera MD- Resident). Patient states they removed tissue to determine if it was endometriosis. Patient reports symptoms became slightly better after procedures (Mini-laparotomy with endometriosis implant excision. Exam under anesthesia with pap smear and IUD insertion). Symptoms started getting bad again. The beginning of  for 6 months she did not have a period but thought it could be due to Depo. Her last depo injection was between 2024. In May/2024 her cycles resumed. She reported her periods were regular but becoming heavier, more painful, with large clots with the past 3-4 cycles. The plan was  Aygestin 5mg QD . Since the last visit the patient reports the plan has somewhat gotten better, she reports when she first started she had increase in bleeding x 14 days and pain, it has since gotten better. Patient reports her bleeding had a foul odor but is now getting better.      US was done today. See details under imaging tab. I have reviewed the preliminary US report. Final report pending physician review. CLARE Hawthorne        Additional OB/GYN History   Last Pap :   Last Completed Pap Smear            PAP SMEAR (Every 3 Years) Next due on 2024  LIQUID-BASED PAP SMEAR WITH HPV GENOTYPING REGARDLESS OF INTERPRETATION (NALLELY,COR,MAD)    2023  Done - negative; performed at     2021  Liquid-based Pap Smear, Screening    2021   Liquid-based Pap Smear, Screening    2019  SCANNED - PAP SMEAR    Only the first 5 history entries have been loaded, but more history exists.                  History of abnormal Pap smear: yes - repeat neg  Tobacco Usage?: No       Current Outpatient Medications:     Magnesium Oxide -Mg Supplement 500 MG tablet, Take 1 tablet by mouth Daily., Disp: , Rfl:     norethindrone (Aygestin) 5 MG tablet, Take 1 tablet by mouth Daily., Disp: 30 tablet, Rfl: 5    SUMAtriptan (IMITREX) 50 MG tablet, Take 1 tablet by mouth 1 (One) Time As Needed for Migraine., Disp: 12 tablet, Rfl: 1    valACYclovir (Valtrex) 500 MG tablet, Take 1 tablet by mouth Daily., Disp: 90 tablet, Rfl: 4     Past Medical History:   Diagnosis Date    Anxiety     Bacterial vaginosis 2017    Cyclic vomiting syndrome     Depression     Endometriosis     History of abnormal cervical Pap smear     2019: LSIL    History of chlamydia     treated    History of trauma     History of trichomoniasis     treated    HSV infection 2016    Migraines     Scoliosis     Diagnosed at age 12        Past Surgical History:   Procedure Laterality Date    BREAST AUGMENTATION  2019     SECTION N/A 2017    Procedure:  SECTION PRIMARY;  Surgeon: Hakeem Retana MD;  Location: [facility] LABOR DELIVERY;  Service:     LIPOSUCTION  10/2020    MYRINGOTOMY W/ TUBES Bilateral     SCAR REVISION  2023    1cm x 3cm nodule at  scar excised; pathology of endometriosis    TONSILLECTOMY AND ADENOIDECTOMY      TRIGGER POINT INJECTION  2022    abdominal wall for  scar pain    WISDOM TOOTH EXTRACTION         The additional following portions of the patient's history were reviewed and updated as appropriate: allergies and current medications.    Review of Systems   Respiratory: Negative.  Negative for shortness of breath.    Cardiovascular: Negative.  Negative for chest pain.   Gastrointestinal: Negative.  Negative for  abdominal distention, abdominal pain and constipation.   Genitourinary:  Positive for menstrual problem and pelvic pain. Negative for dyspareunia, dysuria, pelvic pressure, urinary incontinence, vaginal bleeding, vaginal discharge and vaginal pain.       I have reviewed and agree with the HPI, ROS, and historical information as entered above. Neva Rod, APRN      Objective   /76   Wt 72.6 kg (160 lb)   LMP 2025 (Exact Date)   BMI 28.78 kg/m²     Physical Exam  Vitals and nursing note reviewed. Exam conducted with a chaperone present.   Constitutional:       General: She is not in acute distress.     Appearance: Normal appearance. She is not ill-appearing or toxic-appearing.   HENT:      Head: Normocephalic and atraumatic.   Pulmonary:      Effort: Pulmonary effort is normal.   Neurological:      Mental Status: She is alert and oriented to person, place, and time.   Psychiatric:         Mood and Affect: Mood normal.         Behavior: Behavior normal.         Assessment & Plan     Assessment     Problem List Items Addressed This Visit       Endometriosis - Primary    Overview     Tried Depo, Nuva ring, OCPs.   2023-  scar endometriosis s/p resection with simultaneous Mirena placement.   2023- Mirena removed due to recurrent vaginitis.   2024- Begin Aygestin 5mg QD.    2025- Bled for approx 14 days when starting Aygestin. Symptoms have somewhat improved. She will continue taking for at least 3 months.          Relevant Medications    norethindrone (Aygestin) 5 MG tablet    Menstrual cycle problem    Overview     2024- Heavy & painful. Proceed with TVUS.   2025- Nml uterine cavity. Endometrium w/ 3mm fluid. EMT 7.7mm, Nml cervix, Two small echogenic foci noted @ c/s site. Otherwise, nml.          Relevant Medications    norethindrone (Aygestin) 5 MG tablet         Plan     Menstrual cycle problems secondary to endometriosis.   TVUS results reviewed with patient.    Continue Aygestin as directed.. Call if symptoms persist or worsen. If symptoms persist or worsen, will call to discuss other conservative treatment options for endometriosis (Orilissa, Danocrine, Myfembree). If symptoms fail to improve with conservative options, she will be scheduled with MD to discuss surgical options.   Return 12/10/2025, for Annual physical or sooner if needed.        Neva Rod, APRN  01/20/2025

## 2025-06-02 ENCOUNTER — OFFICE VISIT (OUTPATIENT)
Dept: INTERNAL MEDICINE | Facility: CLINIC | Age: 30
End: 2025-06-02
Payer: COMMERCIAL

## 2025-06-02 VITALS
WEIGHT: 175.2 LBS | HEART RATE: 91 BPM | HEIGHT: 63 IN | BODY MASS INDEX: 31.04 KG/M2 | OXYGEN SATURATION: 98 % | DIASTOLIC BLOOD PRESSURE: 70 MMHG | TEMPERATURE: 97.1 F | SYSTOLIC BLOOD PRESSURE: 110 MMHG

## 2025-06-02 DIAGNOSIS — Z11.3 SCREEN FOR STD (SEXUALLY TRANSMITTED DISEASE): ICD-10-CM

## 2025-06-02 DIAGNOSIS — N89.8 VAGINAL DISCHARGE: ICD-10-CM

## 2025-06-02 DIAGNOSIS — N89.8 VAGINAL ITCHING: Primary | ICD-10-CM

## 2025-06-02 LAB
BACTERIA UR QL AUTO: NORMAL /HPF
BILIRUB UR QL STRIP: NEGATIVE
CLARITY UR: CLEAR
COLOR UR: YELLOW
GLUCOSE UR STRIP-MCNC: NEGATIVE MG/DL
HGB UR QL STRIP.AUTO: ABNORMAL
HYALINE CASTS UR QL AUTO: NORMAL /LPF
KETONES UR QL STRIP: ABNORMAL
LEUKOCYTE ESTERASE UR QL STRIP.AUTO: NEGATIVE
NITRITE UR QL STRIP: NEGATIVE
PH UR STRIP.AUTO: 6 [PH] (ref 5–8)
PROT UR QL STRIP: NEGATIVE
RBC # UR STRIP: NORMAL /HPF
REF LAB TEST METHOD: NORMAL
SP GR UR STRIP: 1.02 (ref 1–1.03)
SQUAMOUS #/AREA URNS HPF: NORMAL /HPF
UROBILINOGEN UR QL STRIP: ABNORMAL
WBC # UR STRIP: NORMAL /HPF

## 2025-06-02 PROCEDURE — 1160F RVW MEDS BY RX/DR IN RCRD: CPT | Performed by: STUDENT IN AN ORGANIZED HEALTH CARE EDUCATION/TRAINING PROGRAM

## 2025-06-02 PROCEDURE — 1159F MED LIST DOCD IN RCRD: CPT | Performed by: STUDENT IN AN ORGANIZED HEALTH CARE EDUCATION/TRAINING PROGRAM

## 2025-06-02 PROCEDURE — 1126F AMNT PAIN NOTED NONE PRSNT: CPT | Performed by: STUDENT IN AN ORGANIZED HEALTH CARE EDUCATION/TRAINING PROGRAM

## 2025-06-02 PROCEDURE — 81001 URINALYSIS AUTO W/SCOPE: CPT | Performed by: STUDENT IN AN ORGANIZED HEALTH CARE EDUCATION/TRAINING PROGRAM

## 2025-06-02 PROCEDURE — 99213 OFFICE O/P EST LOW 20 MIN: CPT | Performed by: STUDENT IN AN ORGANIZED HEALTH CARE EDUCATION/TRAINING PROGRAM

## 2025-06-02 RX ORDER — FLUCONAZOLE 150 MG/1
150 TABLET ORAL ONCE
Qty: 2 TABLET | Refills: 0 | Status: SHIPPED | OUTPATIENT
Start: 2025-06-02 | End: 2025-06-02

## 2025-06-02 RX ORDER — ALBUTEROL SULFATE 90 UG/1
2 INHALANT RESPIRATORY (INHALATION) AS NEEDED
COMMUNITY
Start: 2025-04-27

## 2025-06-02 NOTE — PROGRESS NOTES
Office Note     Name: Chasity Chamorro    : 1995     MRN: [id number]     Chief Complaint  Vaginitis (With discharge )    Subjective     History of Present Illness:  Chasity Chamorro is a 30 y.o. female who presents today for     Complaints of vaginal itching for the past few days.      Review of Systems:   Review of Systems   All other systems reviewed and are negative.      Past Medical History:   Past Medical History:   Diagnosis Date    Anxiety     Bacterial vaginosis 2017    Cyclic vomiting syndrome     Depression     Endometriosis     History of abnormal cervical Pap smear     2019: LSIL    History of chlamydia     treated    History of trauma     History of trichomoniasis     treated    HSV infection 2016    Migraines     Scoliosis     Diagnosed at age 12       Past Surgical History:   Past Surgical History:   Procedure Laterality Date    BREAST AUGMENTATION  2019     SECTION N/A 2017    Procedure:  SECTION PRIMARY;  Surgeon: Hakeem Retana MD;  Location: [facility] LABOR DELIVERY;  Service:     LIPOSUCTION  10/2020    MYRINGOTOMY W/ TUBES Bilateral     SCAR REVISION  2023    1cm x 3cm nodule at  scar excised; pathology of endometriosis    TONSILLECTOMY AND ADENOIDECTOMY      TRIGGER POINT INJECTION  2022    abdominal wall for  scar pain    WISDOM TOOTH EXTRACTION         Family History:   Family History   Problem Relation Age of Onset    No Known Problems Father     Bipolar disorder Mother     Endometriosis Mother     Diabetes Maternal Grandfather     Diabetes Maternal Aunt     Breast cancer Neg Hx     Ovarian cancer Neg Hx     Colon cancer Neg Hx     Uterine cancer Neg Hx        Social History:   Social History     Socioeconomic History    Marital status: Single   Tobacco Use    Smoking status: Never    Smokeless tobacco: Never   Vaping Use    Vaping status: Never Used   Substance and Sexual Activity    Alcohol use: Yes    Drug use: Not  "Currently     Types: Marijuana    Sexual activity: Yes     Partners: Male, Female     Birth control/protection: None       Immunizations:   Immunization History   Administered Date(s) Administered    COVID-19 (MODERNA) 1st,2nd,3rd Dose Monovalent 06/03/2021, 07/28/2021, 01/14/2022    Flu Vaccine Quad PF >36MO 09/22/2017    Flu Vaccine Split Quad 09/22/2017    Fluzone (or Fluarix & Flulaval for VFC) >6mos 09/22/2017, 10/11/2022    HPV Quadrivalent 10/05/2007, 12/06/2007, 04/04/2008    Influenza, Unspecified 09/01/2019    Tdap 10/11/2022        Medications:     Current Outpatient Medications:     albuterol sulfate  (90 Base) MCG/ACT inhaler, Inhale 2 puffs As Needed., Disp: , Rfl:     Magnesium Oxide -Mg Supplement 500 MG tablet, Take 1 tablet by mouth Daily., Disp: , Rfl:     norethindrone (Aygestin) 5 MG tablet, Take 1 tablet by mouth Daily., Disp: 30 tablet, Rfl: 5    SUMAtriptan (IMITREX) 50 MG tablet, Take 1 tablet by mouth 1 (One) Time As Needed for Migraine., Disp: 12 tablet, Rfl: 1    valACYclovir (Valtrex) 500 MG tablet, Take 1 tablet by mouth Daily., Disp: 90 tablet, Rfl: 4    Allergies:   Allergies   Allergen Reactions    Lithium Swelling    Nuts Anaphylaxis     \"I'm allergic to all tree nuts and all fruits, my throat swells and tongue itches\"    Tree Nut Anaphylaxis     \"I'm allergic to all tree nuts and all fruits, my throat swells and tongue itches\"    Fruit & Vegetable Daily [Nutritional Supplements] Swelling    Penicillins Hives    Latex Rash       Objective     Vital Signs  /70   Pulse 91   Temp 97.1 °F (36.2 °C) (Temporal)   Ht 158.8 cm (62.52\")   Wt 79.5 kg (175 lb 3.2 oz)   SpO2 98%   BMI 31.51 kg/m²   Estimated body mass index is 31.51 kg/m² as calculated from the following:    Height as of this encounter: 158.8 cm (62.52\").    Weight as of this encounter: 79.5 kg (175 lb 3.2 oz).          Physical Exam  Constitutional:       Appearance: Normal appearance.   Cardiovascular:      " Rate and Rhythm: Normal rate and regular rhythm.      Pulses: Normal pulses.      Heart sounds: Normal heart sounds.   Pulmonary:      Breath sounds: Normal breath sounds.   Neurological:      Mental Status: She is alert.          Result Review :                  Assessment and Plan     1. Vaginal itching    - Urinalysis With Microscopic - Urine, Clean Catch; Future  - fluconazole (Diflucan) 150 MG tablet; Take 1 tablet by mouth 1 (One) Time for 1 dose. Repeat in 3 days if needed  Dispense: 2 tablet; Refill: 0  - Urinalysis With Microscopic - Urine, Clean Catch    2. Screen for STD (sexually transmitted disease)    - Urinalysis With Microscopic - Urine, Clean Catch; Future  - fluconazole (Diflucan) 150 MG tablet; Take 1 tablet by mouth 1 (One) Time for 1 dose. Repeat in 3 days if needed  Dispense: 2 tablet; Refill: 0  - Urinalysis With Microscopic - Urine, Clean Catch    3. Vaginal discharge    - fluconazole (Diflucan) 150 MG tablet; Take 1 tablet by mouth 1 (One) Time for 1 dose. Repeat in 3 days if needed  Dispense: 2 tablet; Refill: 0  - Urinalysis With Microscopic - Urine, Clean Catch       Follow Up  No follow-ups on file.    Ovidio Cope MD  [facility] [address]  [facility]  [address]  [address] [address] [phone]

## 2025-08-12 DIAGNOSIS — G43.009 MIGRAINE WITHOUT AURA AND WITHOUT STATUS MIGRAINOSUS, NOT INTRACTABLE: ICD-10-CM

## 2025-08-12 DIAGNOSIS — Z01.419 WOMEN'S ANNUAL ROUTINE GYNECOLOGICAL EXAMINATION: ICD-10-CM

## 2025-08-12 DIAGNOSIS — Z11.3 SCREEN FOR STD (SEXUALLY TRANSMITTED DISEASE): ICD-10-CM

## 2025-08-12 RX ORDER — SUMATRIPTAN 50 MG/1
TABLET, FILM COATED ORAL
Qty: 12 TABLET | Refills: 1 | Status: SHIPPED | OUTPATIENT
Start: 2025-08-12

## (undated) DEVICE — SUT VIC 2/0 CT1 27IN J339H BX/36

## (undated) DEVICE — SOL IRR NACL 0.9PCT BT 1000ML

## (undated) DEVICE — PROXIMATE RH ROTATING HEAD SKIN STAPLERS (35 WIDE) CONTAINS 35 STAINLESS STEEL STAPLES: Brand: PROXIMATE

## (undated) DEVICE — PK C/SECT 10

## (undated) DEVICE — GLV SURG BIOGEL LTX PF 7 1/2

## (undated) DEVICE — SUT PLAIN  3/0 CT1 27IN 842H

## (undated) DEVICE — COATED VICRYL  (POLYGLACTIN 910) SUTURE, VIOLET BRAIDED, STERILE, SYNTHETIC ABSORBABLE SUTURE: Brand: COATED VICRYL

## (undated) DEVICE — SUT GUT CHRM 1 CTX 36IN 905H

## (undated) DEVICE — MAT PREVALON MOBL TRANSFR AIR WO/PAD 39X80IN

## (undated) DEVICE — SOL IRR H2O BTL 1000ML STRL

## (undated) DEVICE — TRY SPINE BLCK WHITACRE 25G 3X5IN